# Patient Record
Sex: FEMALE | Race: WHITE | NOT HISPANIC OR LATINO | Employment: UNEMPLOYED | URBAN - NONMETROPOLITAN AREA
[De-identification: names, ages, dates, MRNs, and addresses within clinical notes are randomized per-mention and may not be internally consistent; named-entity substitution may affect disease eponyms.]

---

## 2018-01-22 ENCOUNTER — HISTORICAL (OUTPATIENT)
Dept: ADMINISTRATIVE | Facility: HOSPITAL | Age: 28
End: 2018-01-22

## 2019-06-03 ENCOUNTER — HISTORICAL (OUTPATIENT)
Dept: ADMINISTRATIVE | Facility: HOSPITAL | Age: 29
End: 2019-06-03

## 2021-05-22 ENCOUNTER — HISTORICAL (OUTPATIENT)
Dept: ADMINISTRATIVE | Facility: HOSPITAL | Age: 31
End: 2021-05-22

## 2021-10-21 ENCOUNTER — HISTORICAL (OUTPATIENT)
Dept: ADMINISTRATIVE | Facility: HOSPITAL | Age: 31
End: 2021-10-21

## 2023-02-06 ENCOUNTER — HOSPITAL ENCOUNTER (EMERGENCY)
Facility: HOSPITAL | Age: 33
Discharge: LEFT BEFORE DISCHARGE | End: 2023-02-06
Payer: MEDICARE

## 2023-02-06 VITALS
OXYGEN SATURATION: 99 % | RESPIRATION RATE: 20 BRPM | TEMPERATURE: 99 F | HEART RATE: 115 BPM | BODY MASS INDEX: 28.32 KG/M2 | WEIGHT: 170 LBS | HEIGHT: 65 IN | DIASTOLIC BLOOD PRESSURE: 94 MMHG | SYSTOLIC BLOOD PRESSURE: 139 MMHG

## 2023-02-06 PROCEDURE — 99281 EMR DPT VST MAYX REQ PHY/QHP: CPT

## 2023-12-20 ENCOUNTER — HOSPITAL ENCOUNTER (EMERGENCY)
Facility: HOSPITAL | Age: 33
Discharge: HOME | End: 2023-12-20
Payer: MEDICARE

## 2023-12-20 VITALS
TEMPERATURE: 98 F | OXYGEN SATURATION: 100 % | SYSTOLIC BLOOD PRESSURE: 122 MMHG | BODY MASS INDEX: 28.32 KG/M2 | HEART RATE: 108 BPM | WEIGHT: 170 LBS | RESPIRATION RATE: 16 BRPM | DIASTOLIC BLOOD PRESSURE: 87 MMHG | HEIGHT: 65 IN

## 2023-12-20 DIAGNOSIS — J10.1 INFLUENZA B: Primary | ICD-10-CM

## 2023-12-20 LAB
INFLUENZA A (OHS): NEGATIVE
INFLUENZA B (OHS): POSITIVE
RAPID GROUP A STREP (OHS): NEGATIVE

## 2023-12-20 PROCEDURE — 99283 EMERGENCY DEPT VISIT LOW MDM: CPT

## 2023-12-20 PROCEDURE — 87400 INFLUENZA A/B EACH AG IA: CPT | Performed by: NURSE PRACTITIONER

## 2023-12-20 PROCEDURE — 87651 STREP A DNA AMP PROBE: CPT | Performed by: NURSE PRACTITIONER

## 2023-12-20 RX ORDER — OSELTAMIVIR PHOSPHATE 75 MG/1
75 CAPSULE ORAL 2 TIMES DAILY
Qty: 10 CAPSULE | Refills: 0 | Status: SHIPPED | OUTPATIENT
Start: 2023-12-20 | End: 2023-12-25

## 2023-12-20 NOTE — Clinical Note
"Stormy Jacobo" Mabel was seen and treated in our emergency department on 12/20/2023.  She may return to work on 12/25/2023.       If you have any questions or concerns, please don't hesitate to call.      Jessie Nuno, TUTU"

## 2023-12-21 NOTE — ED PROVIDER NOTES
"Encounter Date: 12/20/2023       History     Chief Complaint   Patient presents with    Sore Throat     Pt reports "my youngest daughter had STREP and FLU about 4 days ago and I think she gave it to us. I have a sore throat and my stomach is burning. I think I need abx or something" Pt denies N/V/D. Pt capillary refill <3. Pt acyanotic. No acute distress noted.         Pt reports "my youngest daughter had STREP and FLU about 4 days ago and I think she gave it to us. I have a sore throat and my stomach is burning. I think I need abx or something" Pt denies N/V/D. Pt capillary refill <3. Pt acyanotic. No acute distress noted.            Review of patient's allergies indicates:  No Known Allergies  Past Medical History:   Diagnosis Date    ADD (attention deficit disorder)      No past surgical history on file.  No family history on file.  Social History     Tobacco Use    Smoking status: Never    Smokeless tobacco: Never   Substance Use Topics    Alcohol use: Never    Drug use: Never     Review of Systems   HENT:  Positive for sore throat.    All other systems reviewed and are negative.      Physical Exam     Initial Vitals [12/20/23 1746]   BP Pulse Resp Temp SpO2   122/87 108 16 98.4 °F (36.9 °C) 100 %      MAP       --         Physical Exam    Nursing note and vitals reviewed.  Constitutional: She appears well-developed and well-nourished.   HENT:   Head: Normocephalic and atraumatic.   Neck:   Normal range of motion.  Cardiovascular:  Normal rate.           Pulmonary/Chest: No respiratory distress.   Musculoskeletal:         General: Normal range of motion.      Cervical back: Normal range of motion.     Neurological: She is alert and oriented to person, place, and time.   Skin: Capillary refill takes less than 2 seconds. No pallor.   Psychiatric: She has a normal mood and affect. Her behavior is normal. Judgment and thought content normal.         ED Course   Procedures  Labs Reviewed   RAPID INFLUENZA A/B - " Abnormal; Notable for the following components:       Result Value    Influenza B Positive (*)     All other components within normal limits   THROAT SCREEN, RAPID STREP - Normal          Imaging Results    None          Medications - No data to display  Medical Decision Making  Problems Addressed:  Influenza B: self-limited or minor problem                                      Clinical Impression:  Final diagnoses:  [J10.1] Influenza B (Primary)          ED Disposition Condition    Discharge Stable          ED Prescriptions       Medication Sig Dispense Start Date End Date Auth. Provider    oseltamivir (TAMIFLU) 75 MG capsule Take 1 capsule (75 mg total) by mouth 2 (two) times daily. for 5 days 10 capsule 12/20/2023 12/25/2023 Jessie Nuno FNP          Follow-up Information       Follow up With Specialties Details Why Contact Info    pcp  Call  As needed              Jessie Nuno FNP  12/20/23 8351

## 2024-02-01 ENCOUNTER — HOSPITAL ENCOUNTER (EMERGENCY)
Facility: HOSPITAL | Age: 34
Discharge: HOME | End: 2024-02-01
Payer: MEDICAID

## 2024-02-01 VITALS
HEIGHT: 65 IN | WEIGHT: 170 LBS | OXYGEN SATURATION: 99 % | RESPIRATION RATE: 19 BRPM | DIASTOLIC BLOOD PRESSURE: 82 MMHG | SYSTOLIC BLOOD PRESSURE: 122 MMHG | HEART RATE: 87 BPM | BODY MASS INDEX: 28.32 KG/M2 | TEMPERATURE: 99 F

## 2024-02-01 DIAGNOSIS — N28.9 DISORDER OF KIDNEY AND URETER, UNSPECIFIED: ICD-10-CM

## 2024-02-01 DIAGNOSIS — R10.9 FLANK PAIN: Primary | ICD-10-CM

## 2024-02-01 LAB
APPEARANCE UR: CLEAR
B-HCG SERPL QL: NEGATIVE
BACTERIA #/AREA URNS AUTO: ABNORMAL /HPF
BILIRUB UR QL STRIP.AUTO: NEGATIVE
COLOR UR AUTO: YELLOW
GLUCOSE UR QL STRIP.AUTO: NEGATIVE
KETONES UR QL STRIP.AUTO: NEGATIVE
LEUKOCYTE ESTERASE UR QL STRIP.AUTO: NEGATIVE
NITRITE UR QL STRIP.AUTO: NEGATIVE
PH UR STRIP.AUTO: 6.5 [PH]
PROT UR QL STRIP.AUTO: NEGATIVE
RBC #/AREA URNS AUTO: ABNORMAL /HPF
RBC UR QL AUTO: ABNORMAL
SP GR UR STRIP.AUTO: 1.02 (ref 1–1.03)
SQUAMOUS #/AREA URNS AUTO: ABNORMAL /HPF
UROBILINOGEN UR STRIP-ACNC: 1
WBC #/AREA URNS AUTO: ABNORMAL /HPF

## 2024-02-01 PROCEDURE — 81025 URINE PREGNANCY TEST: CPT | Performed by: NURSE PRACTITIONER

## 2024-02-01 PROCEDURE — 63600175 PHARM REV CODE 636 W HCPCS: Performed by: NURSE PRACTITIONER

## 2024-02-01 PROCEDURE — 96372 THER/PROPH/DIAG INJ SC/IM: CPT | Performed by: NURSE PRACTITIONER

## 2024-02-01 PROCEDURE — 81003 URINALYSIS AUTO W/O SCOPE: CPT | Performed by: NURSE PRACTITIONER

## 2024-02-01 PROCEDURE — 99285 EMERGENCY DEPT VISIT HI MDM: CPT | Mod: 25

## 2024-02-01 RX ORDER — KETOROLAC TROMETHAMINE 10 MG/1
10 TABLET, FILM COATED ORAL EVERY 6 HOURS PRN
Qty: 15 TABLET | Refills: 0 | Status: SHIPPED | OUTPATIENT
Start: 2024-02-01 | End: 2024-02-06

## 2024-02-01 RX ORDER — TAMSULOSIN HYDROCHLORIDE 0.4 MG/1
0.4 CAPSULE ORAL DAILY
Qty: 7 CAPSULE | Refills: 0 | Status: SHIPPED | OUTPATIENT
Start: 2024-02-01 | End: 2024-02-08

## 2024-02-01 RX ORDER — CEFTRIAXONE 1 G/1
1 INJECTION, POWDER, FOR SOLUTION INTRAMUSCULAR; INTRAVENOUS
Status: COMPLETED | OUTPATIENT
Start: 2024-02-01 | End: 2024-02-01

## 2024-02-01 RX ORDER — NITROFURANTOIN 25; 75 MG/1; MG/1
100 CAPSULE ORAL 2 TIMES DAILY
Qty: 10 CAPSULE | Refills: 0 | Status: SHIPPED | OUTPATIENT
Start: 2024-02-01 | End: 2024-02-06

## 2024-02-01 RX ADMIN — CEFTRIAXONE SODIUM 1 G: 1 INJECTION, POWDER, FOR SOLUTION INTRAMUSCULAR; INTRAVENOUS at 09:02

## 2024-02-02 NOTE — ED PROVIDER NOTES
Encounter Date: 2/1/2024       History     Chief Complaint   Patient presents with    Back Pain    Flank Pain     Pt states she has been having back pain for the last 4 weeks and just thought it was constipation, so she took meds and was able to use the restroom. A few weeks ago she was having burning during urination and retention. Has since gone away and is able to urinate regularly. States she is back to having the pain in her back and both of her sides. Denies any known history of utis or kidney stones.      PT c/o flank pain for 1 month with different associated symptoms. States was having trouble with constipation and urination off and on. Took OTC laxative and had a good BM with some relief (pressure) and was able to void better. Had some dysuria but has subsided. Now having frequency and urgency and feels like output has decreased. No female related problem (finished 2 days ago). States feels a lot pressure in her back. Some nausea no vomiting. No fever or abd pain.No CP or SOB     The history is provided by the patient. No  was used.     Review of patient's allergies indicates:  No Known Allergies  Past Medical History:   Diagnosis Date    ADD (attention deficit disorder)     Depression      No past surgical history on file.  No family history on file.  Social History     Tobacco Use    Smoking status: Never    Smokeless tobacco: Never   Substance Use Topics    Alcohol use: Never    Drug use: Never     Review of Systems   Constitutional:  Positive for appetite change. Negative for diaphoresis, fatigue and fever.   Cardiovascular:  Negative for chest pain.   Gastrointestinal:  Positive for constipation and nausea. Negative for abdominal pain and vomiting.   Genitourinary:  Positive for decreased urine volume, difficulty urinating, flank pain, frequency and urgency. Negative for dysuria, hematuria, vaginal bleeding, vaginal discharge and vaginal pain.   Musculoskeletal:  Negative for gait  problem.   Skin: Negative.    Neurological:  Negative for weakness and headaches.   All other systems reviewed and are negative.      Physical Exam     Initial Vitals [02/01/24 1919]   BP Pulse Resp Temp SpO2   (!) 143/87 79 16 97.9 °F (36.6 °C) 100 %      MAP       --         Physical Exam    Constitutional: Vital signs are normal. She appears well-developed and well-nourished.   HENT:   Head: Normocephalic and atraumatic.   Eyes: Conjunctivae and lids are normal.   Cardiovascular:  Normal rate and regular rhythm.           Pulmonary/Chest: Effort normal and breath sounds normal.   Abdominal: Abdomen is soft and flat. Bowel sounds are normal. There is abdominal tenderness in the left lower quadrant.   There is right CVA tenderness.  There is left CVA tenderness. There is no rebound and no guarding.     Neurological: She is alert and oriented to person, place, and time.   Skin: Skin is warm, dry and intact.   Psychiatric: She has a normal mood and affect. Her speech is normal and behavior is normal.         ED Course   Procedures  Labs Reviewed   URINALYSIS, REFLEX TO URINE CULTURE - Abnormal; Notable for the following components:       Result Value    Blood, UA Small (*)     All other components within normal limits    Narrative:      URINE STABILITY IS 2 HOURS AT ROOM TEMP OR    SIX HOURS REFRIGERATED. PERFORMING TESTING ON    SPECIMENS GREATER THAN THIS AGE MAY AFFECT THE    FOLLOWING TESTS:    PH          SPECIFIC GRAVITY           BLOOD    CLARITY     BILIRUBIN               UROBILINOGEN   URINALYSIS, MICROSCOPIC - Abnormal; Notable for the following components:    RBC, UA 6-10 (*)     All other components within normal limits   HCG QUALITATIVE URINE - Normal          Imaging Results              CT Renal Stone Study ABD Pelvis WO (Final result)  Result time 02/01/24 20:54:41      Final result by Yuan Lopez MD (02/01/24 20:54:41)                   Impression:      Query for mesenteric adenitis.  No evidence  for obstructive uropathy.  Normal appendix.      Electronically signed by: Yuan Lopez MD  Date:    02/01/2024  Time:    20:54               Narrative:    EXAMINATION:  CT RENAL STONE STUDY ABD PELVIS WO    CLINICAL HISTORY:  Flank pain, kidney stone suspected;    TECHNIQUE:  Multiple cross-section obtained from the lung bases the pubic symphysis without the use of contrast.  Coronal and sagittal reformatted images were obtained.  An automated dose exposure technique was utilized this limits radiation does the patient.    COMPARISON:  None    FINDINGS:  Lung bases are clear.  Heart size within normal limits.    Evaluation of hollow and solid viscera is limited secondary to technique.    The liver, adrenals, and pancreas are normal.  Spleen is borderline enlarged.  The gallbladder is contracted.  Kidneys are symmetric in size without evidence for hydronephrosis or calculus.  In the expected location of the ureters no evidence for calculus.  Bladder is under distended normal.    Small bowel is of normal caliber.  Colon is also normal caliber with scattered colonic diverticula.  Moderate to large stool burden is identified throughout the colon.  The appendix is normal with scattered nonenlarged lymph nodes of the mesentery.    Uterus is anteverted without evidence for adnexal mass.  The course and caliber of the abdominal is normal.  No evidence for retroperitoneal or mesenteric adenopathy.    No suspicious osseous lesions.  Scattered spondylotic changes.  The soft tissues are grossly normal.                        Preliminary result by Jovan Ho Jr., MD (02/01/24 20:49:14)                   Impression:    1. There is mild fullness of the right renal pelvicalyceal system. No renal or ureteral calculus is identified. Correlate for possible recently passed right ureteral calculus or pyelonephritis.  2. Details and other findings as discussed above.               Narrative:    START OF REPORT:  Technique: CT  of the abdomen and pelvis was performed with axial images as well as sagittal and coronal reconstruction images without intravenous contrast.    Comparison: None available.    Clinical History: X 4 WKS BI LAT FLANK PN POSITIVE FLU UPT NEG.    Dosage Information: Automated Exposure Control was utilized.    Findings:  Lines and Tubes: None.  Thorax:  Lungs: The visualized lung bases appear unremarkable.  Pleura: No pleural effusion is seen.  Heart: The heart size is within normal limits.  Abdomen:  Abdominal Wall: No abdominal wall pathology is seen.  Liver: The liver appears unremarkable.  Biliary System: No intrahepatic or extrahepatic biliary duct dilatation is seen.  Gallbladder: The gallbladder appears unremarkable.  Pancreas: The pancreas appears unremarkable.  Spleen: The spleen appears unremarkable.  Adrenals: The adrenal glands appear unremarkable.  Kidneys: The left kidney appears unremarkable with no stones cysts masses or hydronephrosis. There is mild fullness of the right renal pelvicalyceal system. No renal or ureteral calculus is identified. Correlate for possible recently passed right ureteral calculus or pyelonephritis.  Aorta: The abdominal aorta appears unremarkable.  IVC: Unremarkable.  Bowel:  Esophagus: The visualized esophagus appears unremarkable.  Stomach: The stomach appears unremarkable.  Duodenum: Unremarkable appearing duodenum.  Small Bowel: The small bowel appears unremarkable.  Colon: Nondistended.  Appendix: The appendix appears unremarkable and is seen on series 10, images 65-72.  Peritoneum: No intraperitoneal free air or ascites is seen.    Pelvis:  Bladder: The bladder is nondistended and cannot be definitively evaluated.  Female:  Uterus: The uterus appears unremarkable.  Ovaries: No adnexal masses are seen.    Bony structures:  Dorsal Spine: The visualized dorsal spine appears unremarkable.  Bony Pelvis: The visualized bony structures of the pelvis appear unremarkable.                                          Medications   cefTRIAXone injection 1 g (has no administration in time range)     Medical Decision Making             ED Course as of 02/01/24 2103   Thu Feb 01, 2024 2050 CT Renal Stone Study ABD Pelvis WO [KL]      ED Course User Index  [KL] Aleshia Kaplan FNP                           Clinical Impression:  Final diagnoses:  [R10.9] Flank pain (Primary)  [N28.9] Disorder of kidney and ureter, unspecified          ED Disposition Condition    Discharge Stable          ED Prescriptions       Medication Sig Dispense Start Date End Date Auth. Provider    nitrofurantoin, macrocrystal-monohydrate, (MACROBID) 100 MG capsule Take 1 capsule (100 mg total) by mouth 2 (two) times daily. for 5 days 10 capsule 2/1/2024 2/6/2024 Aleshia Kaplan FNP    tamsulosin (FLOMAX) 0.4 mg Cap Take 1 capsule (0.4 mg total) by mouth once daily. for 7 days 7 capsule 2/1/2024 2/8/2024 Aleshia Kaplan FNP    ketorolac (TORADOL) 10 mg tablet Take 1 tablet (10 mg total) by mouth every 6 (six) hours as needed for Pain. 15 tablet 2/1/2024 2/6/2024 Aleshia Kaplan FNP          Follow-up Information       Follow up With Specialties Details Why Contact Info    [facility]-Emergency Dept Emergency Medicine  As needed, If symptoms worsen [address]  [facility] [id number] [phone]    [facility] - Urology Urology Schedule an appointment as soon as possible for a visit in 3 days  [address]  [facility] [id number] [phone]             Aleshia Kaplan FNP  02/01/24 2111

## 2024-02-02 NOTE — DISCHARGE INSTRUCTIONS
Take medication as directed, increase fluid intake, warm heating pad. Call to schedule an appt with PCP for follow up and urology.  At anytime thing worsen develop fever return to ED

## 2024-07-17 ENCOUNTER — HOSPITAL ENCOUNTER (EMERGENCY)
Facility: HOSPITAL | Age: 34
Discharge: HOME | End: 2024-07-17
Payer: MEDICARE

## 2024-07-17 VITALS
RESPIRATION RATE: 20 BRPM | DIASTOLIC BLOOD PRESSURE: 87 MMHG | SYSTOLIC BLOOD PRESSURE: 131 MMHG | HEART RATE: 94 BPM | BODY MASS INDEX: 28.51 KG/M2 | HEIGHT: 64 IN | TEMPERATURE: 99 F | OXYGEN SATURATION: 98 % | WEIGHT: 167 LBS

## 2024-07-17 DIAGNOSIS — K12.2 CELLULITIS AND ABSCESS OF ORAL SOFT TISSUES: Primary | ICD-10-CM

## 2024-07-17 PROBLEM — K04.7 DENTAL ABSCESS: Status: ACTIVE | Noted: 2024-07-17

## 2024-07-17 PROCEDURE — 25000003 PHARM REV CODE 250: Performed by: NURSE PRACTITIONER

## 2024-07-17 PROCEDURE — 99283 EMERGENCY DEPT VISIT LOW MDM: CPT

## 2024-07-17 RX ORDER — AMOXICILLIN AND CLAVULANATE POTASSIUM 875; 125 MG/1; MG/1
1 TABLET, FILM COATED ORAL 2 TIMES DAILY
Qty: 14 TABLET | Refills: 0 | Status: SHIPPED | OUTPATIENT
Start: 2024-07-17

## 2024-07-17 RX ORDER — LIDOCAINE HYDROCHLORIDE 20 MG/ML
5 SOLUTION OROPHARYNGEAL
Status: COMPLETED | OUTPATIENT
Start: 2024-07-17 | End: 2024-07-17

## 2024-07-17 RX ORDER — AMOXICILLIN AND CLAVULANATE POTASSIUM 875; 125 MG/1; MG/1
1 TABLET, FILM COATED ORAL
Status: COMPLETED | OUTPATIENT
Start: 2024-07-17 | End: 2024-07-17

## 2024-07-17 RX ADMIN — AMOXICILLIN AND CLAVULANATE POTASSIUM 1 TABLET: 875; 125 TABLET, FILM COATED ORAL at 06:07

## 2024-07-17 RX ADMIN — LIDOCAINE HYDROCHLORIDE 5 ML: 20 SOLUTION ORAL at 06:07

## 2024-07-17 NOTE — ED PROVIDER NOTES
Encounter Date: 7/17/2024       History     Chief Complaint   Patient presents with    Mouth Lesions     Pt ambulatory to triage with c/o rt swollen jaw and oral abscess.  Pt states when she put her mouth guard on last night, felt pain to rt side of mouth and this am she work up with swelling to jaw and abscess in mouth.     33 year old female presents with an abscess to the right lower mouth that started yesterday.      The history is provided by the patient. No  was used.     Review of patient's allergies indicates:  No Known Allergies  Past Medical History:   Diagnosis Date    ADD (attention deficit disorder)     Depression      No past surgical history on file.  No family history on file.  Social History     Tobacco Use    Smoking status: Never    Smokeless tobacco: Never   Substance Use Topics    Alcohol use: Never    Drug use: Never     Review of Systems   HENT:  Positive for dental problem.    All other systems reviewed and are negative.      Physical Exam     Initial Vitals [07/17/24 1757]   BP Pulse Resp Temp SpO2   131/87 94 20 98.9 °F (37.2 °C) 98 %      MAP       --         Physical Exam    Nursing note and vitals reviewed.  Constitutional: She appears well-developed and well-nourished.   HENT:   Head: Normocephalic and atraumatic.   Mouth/Throat: Oropharynx is clear and moist and mucous membranes are normal. Abnormal dentition. Dental abscesses and dental caries present.       Abscess ruptured at home, cellulitis is noted to surrounding gum tissue   Eyes: Conjunctivae and EOM are normal. Pupils are equal, round, and reactive to light.   Neck: Neck supple.   Normal range of motion.  Cardiovascular:  Normal rate, regular rhythm, normal heart sounds and intact distal pulses.           Pulmonary/Chest: Breath sounds normal.   Abdominal: Abdomen is soft. Bowel sounds are normal.   Musculoskeletal:         General: Normal range of motion.      Cervical back: Normal range of motion and neck  supple.     Neurological: She is alert and oriented to person, place, and time. She has normal strength.   Skin: Skin is warm and dry. Capillary refill takes less than 2 seconds.   Psychiatric: She has a normal mood and affect. Her behavior is normal. Judgment and thought content normal.         ED Course   Procedures  Labs Reviewed - No data to display       Imaging Results    None          Medications   amoxicillin-clavulanate 875-125mg per tablet 1 tablet (has no administration in time range)   LIDOcaine viscous HCl 2% oral solution 5 mL (has no administration in time range)     Medical Decision Making  Problems Addressed:  Cellulitis and abscess of oral soft tissues: acute illness or injury    Risk  Prescription drug management.                                      Clinical Impression:  Final diagnoses:  [K12.2] Cellulitis and abscess of oral soft tissues (Primary)          ED Disposition Condition    Discharge Stable          ED Prescriptions       Medication Sig Dispense Start Date End Date Auth. Provider    amoxicillin-clavulanate 875-125mg (AUGMENTIN) 875-125 mg per tablet Take 1 tablet by mouth 2 (two) times daily. 14 tablet 7/17/2024 -- Tara Carlos FNP          Follow-up Information       Follow up With Specialties Details Why Contact Info    [facility]-Emergency Dept Emergency Medicine In 3 days If symptoms worsen [address]  [facility] [id number] [phone]             Tara Carlos FNP  07/17/24 8480

## 2024-08-25 ENCOUNTER — HOSPITAL ENCOUNTER (EMERGENCY)
Facility: HOSPITAL | Age: 34
Discharge: HOME | End: 2024-08-25
Payer: MEDICARE

## 2024-08-25 VITALS
WEIGHT: 165 LBS | DIASTOLIC BLOOD PRESSURE: 97 MMHG | BODY MASS INDEX: 28.17 KG/M2 | HEART RATE: 103 BPM | SYSTOLIC BLOOD PRESSURE: 124 MMHG | RESPIRATION RATE: 20 BRPM | TEMPERATURE: 98 F | OXYGEN SATURATION: 98 % | HEIGHT: 64 IN

## 2024-08-25 DIAGNOSIS — M79.671 FOOT PAIN, RIGHT: ICD-10-CM

## 2024-08-25 DIAGNOSIS — S91.331A PUNCTURE WOUND TO FOOT, RIGHT, INITIAL ENCOUNTER: Primary | ICD-10-CM

## 2024-08-25 DIAGNOSIS — T14.90XA INJURY: ICD-10-CM

## 2024-08-25 PROCEDURE — 25000003 PHARM REV CODE 250: Performed by: NURSE PRACTITIONER

## 2024-08-25 PROCEDURE — 90715 TDAP VACCINE 7 YRS/> IM: CPT | Performed by: NURSE PRACTITIONER

## 2024-08-25 PROCEDURE — 63600175 PHARM REV CODE 636 W HCPCS: Performed by: NURSE PRACTITIONER

## 2024-08-25 PROCEDURE — 99284 EMERGENCY DEPT VISIT MOD MDM: CPT | Mod: 25

## 2024-08-25 PROCEDURE — 90471 IMMUNIZATION ADMIN: CPT | Performed by: NURSE PRACTITIONER

## 2024-08-25 RX ORDER — AMOXICILLIN AND CLAVULANATE POTASSIUM 875; 125 MG/1; MG/1
1 TABLET, FILM COATED ORAL EVERY 12 HOURS
Qty: 20 TABLET | Refills: 0 | Status: SHIPPED | OUTPATIENT
Start: 2024-08-25 | End: 2024-09-04

## 2024-08-25 RX ORDER — IBUPROFEN 600 MG/1
600 TABLET ORAL
Status: COMPLETED | OUTPATIENT
Start: 2024-08-25 | End: 2024-08-25

## 2024-08-25 RX ADMIN — TETANUS TOXOID, REDUCED DIPHTHERIA TOXOID AND ACELLULAR PERTUSSIS VACCINE, ADSORBED 0.5 ML: 5; 2.5; 8; 8; 2.5 SUSPENSION INTRAMUSCULAR at 08:08

## 2024-08-25 RX ADMIN — IBUPROFEN 600 MG: 600 TABLET, FILM COATED ORAL at 08:08

## 2024-08-26 NOTE — ED PROVIDER NOTES
"Encounter Date: 8/25/2024       History     Chief Complaint   Patient presents with    Foot Injury     Stepped on nail , puncture wound to planar aspect of right foot  "burning and tingling" onset today     33 yrs old female presents with right foot pain after stepping on a nail causing burning and tingling to right foot after incident occurred.         Review of patient's allergies indicates:  No Known Allergies  Past Medical History:   Diagnosis Date    ADD (attention deficit disorder)     Depression      History reviewed. No pertinent surgical history.  No family history on file.  Social History     Tobacco Use    Smoking status: Never    Smokeless tobacco: Never   Substance Use Topics    Alcohol use: Never    Drug use: Never     Review of Systems   Musculoskeletal:  Positive for gait problem.        Right foot pain   Skin:  Positive for wound. Negative for color change, pallor and rash.   All other systems reviewed and are negative.      Physical Exam     Initial Vitals [08/25/24 1954]   BP Pulse Resp Temp SpO2   (!) 124/97 103 20 98.2 °F (36.8 °C) 98 %      MAP       --         Physical Exam    Nursing note and vitals reviewed.  Constitutional: She appears well-developed and well-nourished.   HENT:   Head: Normocephalic and atraumatic.   Right Ear: External ear normal.   Left Ear: External ear normal.   Nose: Nose normal.   Mouth/Throat: Oropharynx is clear and moist.   Eyes: Conjunctivae and EOM are normal.   Neck: Neck supple.   Normal range of motion.  Cardiovascular:  Normal rate, regular rhythm, normal heart sounds and intact distal pulses.           Pulmonary/Chest: Breath sounds normal.   Abdominal: Abdomen is soft. Bowel sounds are normal.   Musculoskeletal:         General: Normal range of motion.      Cervical back: Normal range of motion and neck supple.      Right foot: Tenderness present.      Left foot: Normal.      Comments: Tenderness upon palpation to puncture wound on plantar side of right " foot with no surrounding redness or swelling.      Neurological: She is alert and oriented to person, place, and time. She has normal strength and normal reflexes. GCS score is 15. GCS eye subscore is 4. GCS verbal subscore is 5. GCS motor subscore is 6.   Skin: Skin is warm and dry. Capillary refill takes less than 2 seconds.   Psychiatric: She has a normal mood and affect. Her behavior is normal. Judgment and thought content normal.         ED Course   Procedures  Labs Reviewed - No data to display       Imaging Results              X-Ray Foot Complete Right (Final result)  Result time 08/25/24 20:33:36      Final result by Chong Babin MD (08/25/24 20:33:36)                   Impression:      No acute abnormalities are seen      Electronically signed by: Chong Babin MD  Date:    08/25/2024  Time:    20:33               Narrative:    EXAMINATION:  XR FOOT COMPLETE 3 VIEW RIGHT    CLINICAL HISTORY:  . Pain in right foot    TECHNIQUE:  AP, lateral, and oblique views of the right foot were performed.    COMPARISON:  None    FINDINGS:  There are no fractures seen.  There is no dislocation.  There are no bony lesions noted.                        Wet Read by Carlos Alvarenga MD (08/25/24 20:30:06, [facility]-Emergency Dept, Emergency Medicine)    No radiopaque foreign body, bones are normal.                                     Medications   Tdap (BOOSTRIX) vaccine injection 0.5 mL (0.5 mLs Intramuscular Given 8/25/24 2009)   ibuprofen tablet 600 mg (600 mg Oral Given 8/25/24 2009)     Medical Decision Making  33 yrs old female presents with right foot pain after stepping on a nail causing burning and tingling to right foot after incident occurred.           Amount and/or Complexity of Data Reviewed  Radiology: ordered and independent interpretation performed.    Risk  Prescription drug management.  Risk Details: Augmentin as directed. Tylenol or Motrin as needed for pain control. Follow up with  primary care provider in 1-2 days for recheck.                           Medical Decision Making:   Differential Diagnosis:   Foot Fracture, Cellulitis of right foot, Abscess of right foot             Clinical Impression:  Final diagnoses:  [M79.671] Foot pain, right  [S91.331A] Puncture wound to foot, right, initial encounter (Primary)          ED Disposition Condition    Discharge Stable          ED Prescriptions       Medication Sig Dispense Start Date End Date Auth. Provider    amoxicillin-clavulanate 875-125mg (AUGMENTIN) 875-125 mg per tablet Take 1 tablet by mouth every 12 (twelve) hours. for 10 days 20 tablet 8/25/2024 9/4/2024 Eran Costa FNP          Follow-up Information       Follow up With Specialties Details Why Contact Info    Primary Care Provider of your choice  Schedule an appointment as soon as possible for a visit       [facility]  If symptoms worsen [address]  [facility] [id number]  [phone]             Eran Costa FNP  08/25/24 2037

## 2024-09-03 ENCOUNTER — OFFICE VISIT (OUTPATIENT)
Dept: FAMILY MEDICINE | Facility: CLINIC | Age: 34
End: 2024-09-03
Payer: MEDICARE

## 2024-09-03 VITALS
SYSTOLIC BLOOD PRESSURE: 120 MMHG | HEIGHT: 64 IN | BODY MASS INDEX: 30.05 KG/M2 | OXYGEN SATURATION: 99 % | WEIGHT: 176 LBS | TEMPERATURE: 98 F | HEART RATE: 68 BPM | DIASTOLIC BLOOD PRESSURE: 82 MMHG

## 2024-09-03 DIAGNOSIS — Z79.899 ENCOUNTER FOR LONG-TERM (CURRENT) USE OF MEDICATIONS: ICD-10-CM

## 2024-09-03 DIAGNOSIS — L03.311 CELLULITIS OF ABDOMINAL WALL: ICD-10-CM

## 2024-09-03 DIAGNOSIS — Z76.89 ENCOUNTER TO ESTABLISH CARE: Primary | ICD-10-CM

## 2024-09-03 LAB
ALBUMIN SERPL-MCNC: 3.9 G/DL (ref 3.4–5)
ALBUMIN/GLOB SERPL: 1.4 RATIO
ALP SERPL-CCNC: 60 UNIT/L (ref 50–144)
ALT SERPL-CCNC: 12 UNIT/L (ref 1–45)
ANION GAP SERPL CALC-SCNC: 5 MEQ/L (ref 2–13)
AST SERPL-CCNC: 22 UNIT/L (ref 14–36)
BASOPHILS # BLD AUTO: 0.07 X10(3)/MCL (ref 0.01–0.08)
BASOPHILS NFR BLD AUTO: 0.9 % (ref 0.1–1.2)
BILIRUB SERPL-MCNC: 0.4 MG/DL (ref 0–1)
BUN SERPL-MCNC: 5 MG/DL (ref 7–20)
CALCIUM SERPL-MCNC: 9.4 MG/DL (ref 8.4–10.2)
CHLORIDE SERPL-SCNC: 105 MMOL/L (ref 98–110)
CO2 SERPL-SCNC: 28 MMOL/L (ref 21–32)
CREAT SERPL-MCNC: 0.51 MG/DL (ref 0.66–1.25)
CREAT/UREA NIT SERPL: 10 (ref 12–20)
EOSINOPHIL # BLD AUTO: 0.23 X10(3)/MCL (ref 0.04–0.36)
EOSINOPHIL NFR BLD AUTO: 3 % (ref 0.7–7)
ERYTHROCYTE [DISTWIDTH] IN BLOOD BY AUTOMATED COUNT: 12.8 % (ref 11–14.5)
EST. AVERAGE GLUCOSE BLD GHB EST-MCNC: 96.8 MG/DL (ref 70–115)
GFR SERPLBLD CREATININE-BSD FMLA CKD-EPI: >90 ML/MIN/1.73/M2
GLOBULIN SER-MCNC: 2.7 GM/DL (ref 2–3.9)
GLUCOSE SERPL-MCNC: 75 MG/DL (ref 70–115)
HBA1C MFR BLD: 5 % (ref 4–6)
HCT VFR BLD AUTO: 39.7 % (ref 36–48)
HGB BLD-MCNC: 13.4 G/DL (ref 11.8–16)
IMM GRANULOCYTES # BLD AUTO: 0.03 X10(3)/MCL (ref 0–0.03)
IMM GRANULOCYTES NFR BLD AUTO: 0.4 % (ref 0–0.5)
LYMPHOCYTES # BLD AUTO: 1.53 X10(3)/MCL (ref 1.16–3.74)
LYMPHOCYTES NFR BLD AUTO: 20 % (ref 20–55)
MCH RBC QN AUTO: 29.7 PG (ref 27–34)
MCHC RBC AUTO-ENTMCNC: 33.8 G/DL (ref 31–37)
MCV RBC AUTO: 88 FL (ref 79–99)
MONOCYTES # BLD AUTO: 0.34 X10(3)/MCL (ref 0.24–0.36)
MONOCYTES NFR BLD AUTO: 4.5 % (ref 4.7–12.5)
NEUTROPHILS # BLD AUTO: 5.44 X10(3)/MCL (ref 1.56–6.13)
NEUTROPHILS NFR BLD AUTO: 71.2 % (ref 37–73)
NRBC BLD AUTO-RTO: 0 %
PLATELET # BLD AUTO: 356 X10(3)/MCL (ref 140–371)
PMV BLD AUTO: 10 FL (ref 9.4–12.4)
POTASSIUM SERPL-SCNC: 4.4 MMOL/L (ref 3.5–5.1)
PROT SERPL-MCNC: 6.6 GM/DL (ref 6.3–8.2)
RBC # BLD AUTO: 4.51 X10(6)/MCL (ref 4–5.1)
SODIUM SERPL-SCNC: 138 MMOL/L (ref 136–145)
TSH SERPL-ACNC: 2.93 UIU/ML (ref 0.36–3.74)
WBC # BLD AUTO: 7.64 X10(3)/MCL (ref 4–11.5)

## 2024-09-03 PROCEDURE — 84443 ASSAY THYROID STIM HORMONE: CPT | Performed by: NURSE PRACTITIONER

## 2024-09-03 PROCEDURE — 87389 HIV-1 AG W/HIV-1&-2 AB AG IA: CPT | Performed by: NURSE PRACTITIONER

## 2024-09-03 PROCEDURE — 80053 COMPREHEN METABOLIC PANEL: CPT | Performed by: NURSE PRACTITIONER

## 2024-09-03 PROCEDURE — 85025 COMPLETE CBC W/AUTO DIFF WBC: CPT | Performed by: NURSE PRACTITIONER

## 2024-09-03 PROCEDURE — 99204 OFFICE O/P NEW MOD 45 MIN: CPT | Mod: ,,, | Performed by: NURSE PRACTITIONER

## 2024-09-03 PROCEDURE — 82306 VITAMIN D 25 HYDROXY: CPT | Performed by: NURSE PRACTITIONER

## 2024-09-03 PROCEDURE — 36415 COLL VENOUS BLD VENIPUNCTURE: CPT | Performed by: NURSE PRACTITIONER

## 2024-09-03 PROCEDURE — 86803 HEPATITIS C AB TEST: CPT | Performed by: NURSE PRACTITIONER

## 2024-09-03 PROCEDURE — 83036 HEMOGLOBIN GLYCOSYLATED A1C: CPT | Performed by: NURSE PRACTITIONER

## 2024-09-03 RX ORDER — MIRTAZAPINE 15 MG/1
15 TABLET, FILM COATED ORAL NIGHTLY
COMMUNITY
Start: 2024-08-26

## 2024-09-03 RX ORDER — MUPIROCIN 20 MG/G
OINTMENT TOPICAL 3 TIMES DAILY
Qty: 22 G | Refills: 0 | Status: SHIPPED | OUTPATIENT
Start: 2024-09-03 | End: 2024-09-10

## 2024-09-03 RX ORDER — VILOXAZINE HYDROCHLORIDE 200 MG/1
1 CAPSULE, EXTENDED RELEASE ORAL
COMMUNITY
Start: 2024-08-26

## 2024-09-03 RX ORDER — BUSPIRONE HYDROCHLORIDE 15 MG/1
15 TABLET ORAL 2 TIMES DAILY
COMMUNITY
Start: 2024-08-26

## 2024-09-03 RX ORDER — VENLAFAXINE 75 MG/1
75 TABLET ORAL DAILY
COMMUNITY
Start: 2024-08-26

## 2024-09-03 RX ORDER — HYDROXYZINE HYDROCHLORIDE 25 MG/1
25 TABLET, FILM COATED ORAL DAILY PRN
COMMUNITY
Start: 2024-08-26

## 2024-09-03 NOTE — PROGRESS NOTES
"Patient ID: Stormy Monroe  : 1990    Chief Complaint: Establish Care and Rash    Allergies: Patient has No Known Allergies.     History of Present Illness:  The patient presents to clinic for Establish Care and Rash.  Patient presents to clinic to establish care with a PCP.  Has not seen a PCP in quite some time.  Complains of abdominal rash for the past few days.  Unknown exposure.  Denies shortness or breath, difficulty breathing, known allergies.    Social History:  reports that she has never smoked. She has never used smokeless tobacco. She reports that she does not drink alcohol and does not use drugs.    Past Medical History:  has a past medical history of ADD (attention deficit disorder), Anxiety, and Depression.    Surgical History: History reviewed. No pertinent surgical history.    Current Medications:  Current Outpatient Medications   Medication Instructions    busPIRone (BUSPAR) 15 mg, Oral, 2 times daily    hydrOXYzine HCL (ATARAX) 25 mg, Oral, Daily PRN    hydrOXYzine HCL (ATARAX) 25 mg, Oral, 3 times daily PRN    mirtazapine (REMERON) 15 mg, Oral, Nightly    QELBREE 200 mg Cp24 1 capsule, Oral    triamcinolone acetonide 0.1% (KENALOG) 0.1 % cream Topical (Top), 2 times daily    venlafaxine (EFFEXOR) 75 mg, Oral, Daily       Review of Systems   A comprehensive review of symptoms was completed and negative except as noted above.    Visit Vitals  /82   Pulse 68   Temp 98.3 °F (36.8 °C)   Ht 5' 4" (1.626 m)   Wt 79.8 kg (176 lb)   LMP 2024   SpO2 99%   BMI 30.21 kg/m²       Physical Exam  Vitals and nursing note reviewed.   Constitutional:       General: She is not in acute distress.     Appearance: Normal appearance. She is normal weight. She is not toxic-appearing.   HENT:      Head: Normocephalic and atraumatic.      Right Ear: Tympanic membrane, ear canal and external ear normal.      Left Ear: Tympanic membrane, ear canal and external ear normal.      Nose: Nose normal.      " Mouth/Throat:      Mouth: Mucous membranes are moist.      Pharynx: Oropharynx is clear.   Eyes:      Extraocular Movements: Extraocular movements intact.      Conjunctiva/sclera: Conjunctivae normal.      Pupils: Pupils are equal, round, and reactive to light.   Cardiovascular:      Rate and Rhythm: Normal rate and regular rhythm.      Heart sounds: Normal heart sounds. No murmur heard.     No friction rub. No gallop.   Pulmonary:      Effort: Pulmonary effort is normal.      Breath sounds: Normal breath sounds.   Abdominal:      General: Abdomen is flat. Bowel sounds are normal. There is no distension.      Palpations: Abdomen is soft. There is no mass.      Tenderness: There is no abdominal tenderness. There is no guarding or rebound.      Hernia: No hernia is present.   Musculoskeletal:         General: Normal range of motion.      Cervical back: Normal range of motion and neck supple.      Right lower leg: No edema.      Left lower leg: No edema.   Skin:     General: Skin is warm and dry.      Coloration: Skin is not jaundiced or pale.      Findings: Rash (c/o rash to abdomen - exam findings consistent with mild cellulitis. No palpable mass/swelling/abscess.) present.   Neurological:      General: No focal deficit present.      Mental Status: She is alert and oriented to person, place, and time. Mental status is at baseline.   Psychiatric:         Mood and Affect: Mood normal.         Behavior: Behavior normal.         Thought Content: Thought content normal.         Judgment: Judgment normal.          Labs Reviewed:  Chemistry:  Lab Results   Component Value Date     09/03/2024    K 4.4 09/03/2024    BUN 5 (L) 09/03/2024    CREATININE 0.51 (L) 09/03/2024    EGFRNORACEVR >90 09/03/2024    GLUCOSE 75 09/03/2024    CALCIUM 9.4 09/03/2024    ALKPHOS 60 09/03/2024    LABPROT 6.6 09/03/2024    ALBUMIN 3.9 09/03/2024    AST 22 09/03/2024    ALT 12 09/03/2024    [id number] 34 09/03/2024    TSH 2.930 09/03/2024         Lab Results   Component Value Date    HGBA1C 5.0 09/03/2024        Hematology:  Lab Results   Component Value Date    WBC 7.64 09/03/2024    RBC 4.51 09/03/2024    HGB 13.4 09/03/2024    HCT 39.7 09/03/2024    MCV 88.0 09/03/2024    MCH 29.7 09/03/2024    MCHC 33.8 09/03/2024    RDW 12.8 09/03/2024     09/03/2024    MPV 10.0 09/03/2024         Assessment & Plan:  1. Encounter to establish care  -     Hepatitis C Antibody  -     HIV 1/2 Ag/Ab (4th Gen)  -     Hemoglobin A1C  -     Vitamin D  -     TSH  -     Comprehensive Metabolic Panel  -     CBC Auto Differential  -     Ambulatory referral/consult to Gynecology; Future; Expected date: 09/10/2024    2. Cellulitis of abdominal wall  -     mupirocin (BACTROBAN) 2 % ointment; Apply topically 3 (three) times daily. for 7 days  Dispense: 22 g; Refill: 0    3. Encounter for long-term (current) use of medications  -     Hemoglobin A1C  -     Vitamin D  -     TSH  -     CBC Auto Differential         Follow up in about 1 week (around 9/10/2024) for lab review, fasting lab at [facility]   Return to the clinic as needed.    Future Appointments   Date Time Provider Department Center   9/18/2024  8:00 AM Karolyn Lopez NP [facility]   9/26/2024 10:00 AM Gayathri Banda WHNP [facility] [place] [facility] [place]

## 2024-09-04 ENCOUNTER — TELEPHONE (OUTPATIENT)
Dept: FAMILY MEDICINE | Facility: CLINIC | Age: 34
End: 2024-09-04
Payer: MEDICARE

## 2024-09-04 LAB
25(OH)D3+25(OH)D2 SERPL-MCNC: 34 NG/ML (ref 30–80)
HCV AB SERPL QL IA: NONREACTIVE
HIV 1+2 AB+HIV1 P24 AG SERPL QL IA: NONREACTIVE

## 2024-09-04 NOTE — TELEPHONE ENCOUNTER
Tried calling pt and it went straight to .     If I am not around when she calls back: There is nothing concerning or critical on the labs we received. Will go over all of them at follow up appt

## 2024-09-09 ENCOUNTER — HOSPITAL ENCOUNTER (EMERGENCY)
Facility: HOSPITAL | Age: 34
Discharge: HOME | End: 2024-09-09
Attending: FAMILY MEDICINE
Payer: MEDICARE

## 2024-09-09 VITALS
RESPIRATION RATE: 19 BRPM | HEIGHT: 64 IN | BODY MASS INDEX: 29.02 KG/M2 | WEIGHT: 170 LBS | DIASTOLIC BLOOD PRESSURE: 79 MMHG | TEMPERATURE: 98 F | SYSTOLIC BLOOD PRESSURE: 119 MMHG | HEART RATE: 87 BPM | OXYGEN SATURATION: 99 %

## 2024-09-09 DIAGNOSIS — L23.7 POISON IVY: Primary | ICD-10-CM

## 2024-09-09 PROCEDURE — 99284 EMERGENCY DEPT VISIT MOD MDM: CPT

## 2024-09-09 PROCEDURE — 63600175 PHARM REV CODE 636 W HCPCS: Performed by: FAMILY MEDICINE

## 2024-09-09 RX ORDER — PREDNISONE 20 MG/1
60 TABLET ORAL
Status: COMPLETED | OUTPATIENT
Start: 2024-09-09 | End: 2024-09-09

## 2024-09-09 RX ORDER — PREDNISONE 20 MG/1
20 TABLET ORAL DAILY
Qty: 5 TABLET | Refills: 0 | Status: SHIPPED | OUTPATIENT
Start: 2024-09-09 | End: 2024-09-14

## 2024-09-09 RX ORDER — HYDROXYZINE HYDROCHLORIDE 25 MG/1
25 TABLET, FILM COATED ORAL 3 TIMES DAILY PRN
Qty: 30 TABLET | Refills: 0 | Status: SHIPPED | OUTPATIENT
Start: 2024-09-09

## 2024-09-09 RX ORDER — TRIAMCINOLONE ACETONIDE 1 MG/G
CREAM TOPICAL 2 TIMES DAILY
Qty: 30 G | Refills: 0 | Status: SHIPPED | OUTPATIENT
Start: 2024-09-09

## 2024-09-09 RX ADMIN — PREDNISONE 60 MG: 20 TABLET ORAL at 10:09

## 2024-09-09 NOTE — TELEPHONE ENCOUNTER
Tried calling pt back multiple times to inform her we will discuss everything at her f/u visit. Left her multiple vms as well    Pt is coming in on 9/18/24

## 2024-09-10 NOTE — ED PROVIDER NOTES
Encounter Date: 9/9/2024       History     Chief Complaint   Patient presents with    Rash     Rash to bilat forearm x 2 days     Patient presents for evaluation of rash.  Patient admits to exposure to poison ivy this week.  Patient notes having pruritic rash both arms.  Patient denies having any other associated symptoms at present denies fevers chills.    The history is provided by the patient.     Review of patient's allergies indicates:  No Known Allergies  Past Medical History:   Diagnosis Date    ADD (attention deficit disorder)     Anxiety     Depression      History reviewed. No pertinent surgical history.  No family history on file.  Social History     Tobacco Use    Smoking status: Never    Smokeless tobacco: Never   Substance Use Topics    Alcohol use: Never    Drug use: Never     Review of Systems   Constitutional: Negative.    HENT: Negative.     Eyes: Negative.    Respiratory: Negative.     Cardiovascular: Negative.    Gastrointestinal: Negative.    Endocrine: Negative.    Genitourinary: Negative.    Musculoskeletal: Negative.    Skin:         Poison ivy exposure.   Allergic/Immunologic: Negative.    Neurological: Negative.    Hematological: Negative.    Psychiatric/Behavioral: Negative.         Physical Exam     Initial Vitals [09/09/24 2127]   BP Pulse Resp Temp SpO2   123/83 80 20 98.1 °F (36.7 °C) 99 %      MAP       --         Physical Exam    Vitals reviewed.  Constitutional: She appears well-developed and well-nourished. She is not diaphoretic. No distress.   HENT:   Head: Normocephalic and atraumatic.   Mouth/Throat: No oropharyngeal exudate.   Eyes: Conjunctivae and EOM are normal. Pupils are equal, round, and reactive to light. Right eye exhibits no discharge. Left eye exhibits no discharge. No scleral icterus.   Neck: Neck supple. No thyromegaly present. No tracheal deviation present.   Normal range of motion.  Cardiovascular:  Normal rate and regular rhythm.     Exam reveals no gallop and  no friction rub.       No murmur heard.  Pulmonary/Chest: Breath sounds normal. No stridor. No respiratory distress. She has no wheezes. She has no rhonchi. She has no rales.   Abdominal: Abdomen is soft. Bowel sounds are normal. She exhibits no distension. There is no abdominal tenderness. There is no rebound and no guarding.   Musculoskeletal:         General: No tenderness or edema. Normal range of motion.      Cervical back: Normal range of motion and neck supple.     Neurological: She is alert and oriented to person, place, and time. She has normal reflexes. She displays normal reflexes. No cranial nerve deficit or sensory deficit. GCS score is 15. GCS eye subscore is 4. GCS verbal subscore is 5. GCS motor subscore is 6.   Skin: Skin is warm. Capillary refill takes less than 2 seconds. Rash noted.   Maculopapular rash bilateral arms.   Psychiatric: She has a normal mood and affect.         ED Course   Procedures  Labs Reviewed - No data to display       Imaging Results    None          Medications   predniSONE tablet 60 mg (has no administration in time range)     Medical Decision Making  Risk  Prescription drug management.                                      Clinical Impression:  Final diagnoses:  [L23.7] Poison ivy (Primary)          ED Disposition Condition    Discharge Stable          ED Prescriptions       Medication Sig Dispense Start Date End Date Auth. Provider    predniSONE (DELTASONE) 20 MG tablet Take 1 tablet (20 mg total) by mouth once daily. for 5 days 5 tablet 9/9/2024 9/14/2024 Renato Victoria MD    triamcinolone acetonide 0.1% (KENALOG) 0.1 % cream Apply topically 2 (two) times daily. 30 g 9/9/2024 -- Renato Victoria MD    hydrOXYzine HCL (ATARAX) 25 MG tablet Take 1 tablet (25 mg total) by mouth 3 (three) times daily as needed for Itching. 30 tablet 9/9/2024 -- Renato Victoria MD          Follow-up Information    None          Renato Victoria MD  09/09/24 [id number]

## 2024-10-01 ENCOUNTER — OFFICE VISIT (OUTPATIENT)
Dept: FAMILY MEDICINE | Facility: CLINIC | Age: 34
End: 2024-10-01
Payer: MEDICARE

## 2024-10-01 VITALS
HEART RATE: 85 BPM | HEIGHT: 64 IN | DIASTOLIC BLOOD PRESSURE: 70 MMHG | WEIGHT: 175 LBS | BODY MASS INDEX: 29.88 KG/M2 | OXYGEN SATURATION: 98 % | SYSTOLIC BLOOD PRESSURE: 112 MMHG | TEMPERATURE: 98 F

## 2024-10-01 DIAGNOSIS — Z71.2 PERSON CONSULTING FOR EXPLANATION OF EXAMINATION OR TEST FINDING: Primary | ICD-10-CM

## 2024-10-01 DIAGNOSIS — Z76.89 ENCOUNTER TO ESTABLISH CARE: ICD-10-CM

## 2024-10-01 DIAGNOSIS — Z79.899 ENCOUNTER FOR LONG-TERM (CURRENT) USE OF MEDICATIONS: ICD-10-CM

## 2024-10-01 LAB
CHOLEST SERPL-MCNC: 132 MG/DL (ref 0–200)
HDLC SERPL-MCNC: 58 MG/DL (ref 40–60)
LDLC SERPL DIRECT ASSAY-SCNC: 66.7 MG/DL (ref 30–100)
TRIGL SERPL-MCNC: 50 MG/DL (ref 30–200)

## 2024-10-01 PROCEDURE — G2211 COMPLEX E/M VISIT ADD ON: HCPCS | Mod: ,,, | Performed by: NURSE PRACTITIONER

## 2024-10-01 PROCEDURE — 99214 OFFICE O/P EST MOD 30 MIN: CPT | Mod: ,,, | Performed by: NURSE PRACTITIONER

## 2024-10-01 PROCEDURE — 80061 LIPID PANEL: CPT | Performed by: NURSE PRACTITIONER

## 2024-10-01 NOTE — PROGRESS NOTES
"Patient ID: Stormy Monroe  : 1990    Chief Complaint: lab work (Fasting today for lipid) and Results (New pt labs done last week )    Allergies: Patient has No Known Allergies.     History of Present Illness:  The patient presents to clinic for lab work (Fasting today for lipid) and Results (New pt labs done last week )     Social History:  reports that she has never smoked. She has never used smokeless tobacco. She reports that she does not drink alcohol and does not use drugs.    Past Medical History:  has a past medical history of ADD (attention deficit disorder), Anxiety, and Depression.    Surgical History: History reviewed. No pertinent surgical history.    Current Medications:  Current Outpatient Medications   Medication Instructions    busPIRone (BUSPAR) 15 mg, 2 times daily    hydrOXYzine HCL (ATARAX) 25 mg, Oral, 3 times daily PRN    mirtazapine (REMERON) 15 mg, Nightly    QELBREE 200 mg Cp24 1 capsule    triamcinolone acetonide 0.1% (KENALOG) 0.1 % cream Topical (Top), 2 times daily    venlafaxine (EFFEXOR) 75 mg, Daily       Review of Systems   A comprehensive review of symptoms was completed and negative except as noted above.    Visit Vitals  /70 (BP Location: Right arm, Patient Position: Sitting)   Pulse 85   Temp 98.1 °F (36.7 °C) (Temporal)   Ht 5' 4" (1.626 m)   Wt 79.4 kg (175 lb)   LMP 2024 (Exact Date)   SpO2 98%   BMI 30.04 kg/m²       Physical Exam  Vitals and nursing note reviewed.   Constitutional:       General: She is not in acute distress.     Appearance: Normal appearance. She is not toxic-appearing.   HENT:      Head: Normocephalic and atraumatic.      Nose: Nose normal.      Mouth/Throat:      Mouth: Mucous membranes are moist.      Pharynx: Oropharynx is clear.   Eyes:      Extraocular Movements: Extraocular movements intact.      Conjunctiva/sclera: Conjunctivae normal.      Pupils: Pupils are equal, round, and reactive to light.   Cardiovascular:      Rate and " Rhythm: Normal rate and regular rhythm.      Heart sounds: Normal heart sounds. No murmur heard.     No friction rub. No gallop.   Pulmonary:      Effort: Pulmonary effort is normal.      Breath sounds: Normal breath sounds.   Musculoskeletal:         General: Normal range of motion.      Cervical back: Normal range of motion and neck supple.   Skin:     General: Skin is warm and dry.      Coloration: Skin is not jaundiced or pale.      Findings: No rash.   Neurological:      General: No focal deficit present.      Mental Status: She is alert and oriented to person, place, and time. Mental status is at baseline.   Psychiatric:         Mood and Affect: Mood normal.         Behavior: Behavior normal.         Thought Content: Thought content normal.         Judgment: Judgment normal.          Labs Reviewed:  Chemistry:  Lab Results   Component Value Date     09/03/2024    K 4.4 09/03/2024    BUN 5 (L) 09/03/2024    CREATININE 0.51 (L) 09/03/2024    EGFRNORACEVR >90 09/03/2024    GLUCOSE 75 09/03/2024    CALCIUM 9.4 09/03/2024    ALKPHOS 60 09/03/2024    LABPROT 6.6 09/03/2024    ALBUMIN 3.9 09/03/2024    AST 22 09/03/2024    ALT 12 09/03/2024    [id number] 34 09/03/2024    TSH 2.930 09/03/2024        Lab Results   Component Value Date    HGBA1C 5.0 09/03/2024        Hematology:  Lab Results   Component Value Date    WBC 7.64 09/03/2024    RBC 4.51 09/03/2024    HGB 13.4 09/03/2024    HCT 39.7 09/03/2024    MCV 88.0 09/03/2024    MCH 29.7 09/03/2024    MCHC 33.8 09/03/2024    RDW 12.8 09/03/2024     09/03/2024    MPV 10.0 09/03/2024       Lipid Panel:  Lab Results   Component Value Date    CHOL 132 10/01/2024    HDL 58 10/01/2024    TRIG 50 10/01/2024        Assessment & Plan:  1. Person consulting for explanation of examination or test finding    2. Encounter to establish care  -     Lipid Panel; Future; Expected date: 10/01/2024    3. Encounter for long-term (current) use of medications  -     Lipid  Panel; Future; Expected date: 10/01/2024         Follow up in about 3 months (around 1/1/2025) for Follow Up, Behavioral Health referral f/u, GYN referral f/u.   Return to the clinic as needed.    Future Appointments   Date Time Provider Department Center   10/7/2024 10:00 AM Gayathri Banda WHNP [facility]   1/9/2025 10:00 AM Karolyn Lopez NP [facility]       Lab Frequency Next Occurrence   Ambulatory referral/consult to Gynecology Once 09/10/2024        Karolyn Lopez, WOODYP-C

## 2024-10-01 NOTE — PATIENT INSTRUCTIONS
Call the clinic after 4pm for cholesterol results.     Call [facility] to find out when your next appt is.

## 2024-10-02 ENCOUNTER — TELEPHONE (OUTPATIENT)
Dept: FAMILY MEDICINE | Facility: CLINIC | Age: 34
End: 2024-10-02

## 2024-10-07 ENCOUNTER — OFFICE VISIT (OUTPATIENT)
Dept: OBSTETRICS AND GYNECOLOGY | Facility: CLINIC | Age: 34
End: 2024-10-07
Payer: MEDICARE

## 2024-10-07 VITALS
WEIGHT: 174.38 LBS | DIASTOLIC BLOOD PRESSURE: 66 MMHG | TEMPERATURE: 98 F | BODY MASS INDEX: 29.77 KG/M2 | SYSTOLIC BLOOD PRESSURE: 112 MMHG | HEIGHT: 64 IN

## 2024-10-07 DIAGNOSIS — Z11.3 SCREENING EXAMINATION FOR STD (SEXUALLY TRANSMITTED DISEASE): ICD-10-CM

## 2024-10-07 DIAGNOSIS — Z76.89 ENCOUNTER TO ESTABLISH CARE: ICD-10-CM

## 2024-10-07 DIAGNOSIS — Z01.419 WELL WOMAN EXAM WITH ROUTINE GYNECOLOGICAL EXAM: Primary | ICD-10-CM

## 2024-10-07 DIAGNOSIS — R10.2 PELVIC PAIN: ICD-10-CM

## 2024-10-07 DIAGNOSIS — Z12.4 CERVICAL CANCER SCREENING: ICD-10-CM

## 2024-10-07 PROCEDURE — 87661 TRICHOMONAS VAGINALIS AMPLIF: CPT

## 2024-10-07 PROCEDURE — 87491 CHLMYD TRACH DNA AMP PROBE: CPT

## 2024-10-07 PROCEDURE — G0101 CA SCREEN;PELVIC/BREAST EXAM: HCPCS | Mod: GZ,,,

## 2024-10-07 PROCEDURE — 87591 N.GONORRHOEAE DNA AMP PROB: CPT

## 2024-10-07 PROCEDURE — 87624 HPV HI-RISK TYP POOLED RSLT: CPT

## 2024-10-07 RX ORDER — ARIPIPRAZOLE 5 MG/1
5 TABLET ORAL
COMMUNITY
Start: 2024-10-04

## 2024-10-07 NOTE — PROGRESS NOTES
Chief Complaint     Well Woman    HPI:     Patient is a 33 y.o.  presents today referred by Karolyn Lopez NP to establish GYN care. Cyclic menses, 4 days in duration, normal flow, mild dysmenorrhea. Denies abnormal discharge, odor, bleeding. Reports occ pelvic cramping, occ cramping with intercourse.   Denies hx of abnormal PAP, last PAP ~ 8 years ago with Dr Israel in [place]. Partner with vasectomy.     Gyn History:    Menstrual History  Cycle: Yes (2024)  Menarche Age: 14 years  Flow Duration: 4  Flow: Normal  Interval: 28  Intermenstrual Bleeding: No  Dysmenorrhea: Yes  Dysmenorrhea Severity : Mild    Menopause  Menopause Age: 2 years    Pap History  Last pap date:  (unknown per pt)  Result: Normal  History of Abnormal Pap: No  HPV Vaccine Completed: No (1/3)    [place]  Sexually Active: Yes  Sexual Orientation: heterosexual  Postcoital Bleeding: No  Dyspareunia: Yes  STI History: No  Contraception: No    Breast History  Last Breast Imaging Date: No  History of Breast Biopsy: No        Past Medical History:   Diagnosis Date    ADD (attention deficit disorder)     Anxiety     Depression        History reviewed. No pertinent surgical history.    Family History   Problem Relation Name Age of Onset    Breast cancer Neg Hx      Colon cancer Neg Hx      Ovarian cancer Neg Hx      Uterine cancer Neg Hx         OB History          3    Para   3    Term   3            AB        Living   2         SAB        IAB        Ectopic        Multiple        Live Births   2                 Current Outpatient Medications on File Prior to Visit   Medication Sig Dispense Refill    ARIPiprazole (ABILIFY) 5 MG Tab Take 5 mg by mouth.      busPIRone (BUSPAR) 15 MG tablet Take 15 mg by mouth 2 (two) times daily.      hydrOXYzine HCL (ATARAX) 25 MG tablet Take 1 tablet (25 mg total) by mouth 3 (three) times daily as needed for Itching. 30 tablet 0    mirtazapine (REMERON) 15 MG tablet Take 15 mg by  "mouth every evening.      QELBREE 200 mg Cp24 Take 1 capsule by mouth.      venlafaxine (EFFEXOR) 75 MG tablet Take 75 mg by mouth Daily.      [DISCONTINUED] triamcinolone acetonide 0.1% (KENALOG) 0.1 % cream Apply topically 2 (two) times daily. 30 g 0     No current facility-administered medications on file prior to visit.       Review of Systems:       Review of Systems   Constitutional:  Negative for appetite change, chills, fatigue, fever and unexpected weight change.   Eyes:  Negative for visual disturbance.   Respiratory:  Negative for cough, shortness of breath and wheezing.    Cardiovascular:  Negative for chest pain, palpitations and leg swelling.   Gastrointestinal:  Negative for abdominal pain, bloating, blood in stool, constipation, diarrhea, nausea, vomiting, reflux and fecal incontinence.   Endocrine: Negative for hair loss and hot flashes.   Genitourinary:  Negative for bladder incontinence, decreased libido, dysmenorrhea, dyspareunia, dysuria, flank pain, frequency, genital sores, hematuria, hot flashes, menorrhagia, menstrual problem, pelvic pain, urgency, vaginal bleeding, vaginal discharge, vaginal pain, urinary incontinence, postcoital bleeding, postmenopausal bleeding, vaginal dryness and vaginal odor.   Integumentary:  Negative for rash, acne, hair changes, breast mass, nipple discharge, breast skin changes and breast tenderness.   Neurological:  Negative for headaches.   Psychiatric/Behavioral:  Negative for depression.    Breast: Negative for asymmetry, breast self exam, lump, mass, mastodynia, nipple discharge, skin changes and tenderness       Physical Exam:    /66 (BP Location: Left arm, Patient Position: Sitting)   Temp 97.9 °F (36.6 °C) (Temporal)   Ht 5' 4" (1.626 m)   Wt 79.1 kg (174 lb 6.4 oz)   LMP 09/17/2024 (Exact Date)   BMI 29.94 kg/m²     Physical Exam   Chaperone: present.    General appearance: - healthy, well-nourished and well-developed    Psychiatric: Orientation " to time, place and person. Normal mood and affect and active, alert    Skin:  Appearance: no rashes or lesions.    Neck:  Neck: supple, FROM, trachea midline. and no masses  Thyroid: no enlargement or nodules and non-tender.      Cardiovascular  Auscultation: RRR and no murmur.  Peripheral Vascular: no varicosities, LLE edema, RLE edema, calf tenderness, and palpable cord and pedal pulses intact.    Lungs:  Respiratory effort: no intercostal retractions or accessory muscle usage.  Auscultation: no wheezing, rales/crackles, or rhonchi and clear to auscultation.    Breast:  Inspection/Palpation: no tenderness, discrete/distinct masses, skin changes, or abnormal secretions. Nipple appearance normal.    Abdomen:  Auscultation/Inspection/Palpation: no hepatomegaly, splenomegaly, masses , tenderness or CVA tenderness and soft, non distended bowel sounds preset.   Hernia: no palpable hernias.    Female Genitalia:  Vulva: no masses, tenderness or lesions  Bladder/Urethra: no urethral discharge or mass, normal meatus, bladder non-distended.  Vagina: no tenderness,erythema, cystocele, rectocele, abnormal vaginal discharge,or vesicle(s) or ulcers  Cervix: no discharge , no cervical lacerations noted or motion tenderness and grossly normal  Uterus: normal size and shape and midline, non-tender, and no uterine prolapse.  Adnexa/Parametria: no parametrial tenderness or mass, no adnexal tenderness or ovarian mass.    Lymph Nodes:  Palpation: non tender submandibular nodes, axillary nodes, or inguinal nodes.    Rectal Exam:  Rectum: normal perianal skin      Assessment:   1. Well woman exam with routine gynecological exam  -     Liquid-Based Pap Smear, Screening    2. Encounter to establish care  -     Ambulatory referral/consult to Gynecology    3. Pelvic pain  -     MDL Sendout Test    4. Screening examination for STD (sexually transmitted disease)    5. Cervical cancer screening             Plan:   1. Well woman exam with  routine gynecological exam  - Liquid-Based Pap Smear, Screening    2. Encounter to establish care  - Ambulatory referral/consult to Gynecology    3. Pelvic pain  - MDL Sendout Test    4. Screening examination for STD (sexually transmitted disease)    5. Cervical cancer screening    Pap GC CZ TV  Recommend BSE monthly  Discussed recommendations of annual screening after age of 40 with mammogram    Explained that screening is not 100% reliable. Advised patient if she notices any changes to her breast including a lump, mass, dimpling, discharge, rash, or tenderness she should contact us immediately.     Healthy diet, exercise   Multivitamin   Seat belt   Sunscreen use   Safe sex/ STI education   Contraception evaluation: partner with vasectomy   Gardasil evaluation: 1/3    Pap GC CZ TV  ONESWAB MYCO UREA   If WNL and continued will order pelvic u/s    RTC    This note was transcribed by Romy Villa. There may be transcription errors as a result, however minimal. Effort has been made to ensure accuracy of transcription, but any obvious errors or omissions should be clarified with the author of the document.       I agree with the above documentation.

## 2024-10-10 LAB
CHLAMYDIA TRACHOMATIS: NEGATIVE
HIGH RISK HPV: NEGATIVE
NEISSERIA GONORRHOEAE: NEGATIVE
PSYCHE PATHOLOGY RESULT: NORMAL
TRICHOMONAS VAGINALIS: NEGATIVE

## 2024-11-25 ENCOUNTER — OFFICE VISIT (OUTPATIENT)
Dept: FAMILY MEDICINE | Facility: CLINIC | Age: 34
End: 2024-11-25
Payer: MEDICARE

## 2024-11-25 VITALS
HEIGHT: 64 IN | TEMPERATURE: 98 F | WEIGHT: 176 LBS | HEART RATE: 97 BPM | OXYGEN SATURATION: 98 % | BODY MASS INDEX: 30.05 KG/M2

## 2024-11-25 DIAGNOSIS — F43.10 PTSD (POST-TRAUMATIC STRESS DISORDER): ICD-10-CM

## 2024-11-25 DIAGNOSIS — F32.0 CURRENT MILD EPISODE OF MAJOR DEPRESSIVE DISORDER WITHOUT PRIOR EPISODE: ICD-10-CM

## 2024-11-25 DIAGNOSIS — F98.8 ATTENTION DEFICIT DISORDER, UNSPECIFIED TYPE: Primary | ICD-10-CM

## 2024-11-25 DIAGNOSIS — F41.1 GENERALIZED ANXIETY DISORDER: ICD-10-CM

## 2024-11-25 PROCEDURE — 99214 OFFICE O/P EST MOD 30 MIN: CPT | Mod: ,,, | Performed by: NURSE PRACTITIONER

## 2024-11-25 PROCEDURE — G2211 COMPLEX E/M VISIT ADD ON: HCPCS | Mod: ,,, | Performed by: NURSE PRACTITIONER

## 2024-11-25 RX ORDER — ARIPIPRAZOLE 5 MG/1
5 TABLET ORAL DAILY
Qty: 30 TABLET | Refills: 2 | Status: SHIPPED | OUTPATIENT
Start: 2024-11-25 | End: 2024-11-26 | Stop reason: SDUPTHER

## 2024-11-25 RX ORDER — BUSPIRONE HYDROCHLORIDE 15 MG/1
15 TABLET ORAL 2 TIMES DAILY
Qty: 60 TABLET | Refills: 2 | Status: SHIPPED | OUTPATIENT
Start: 2024-11-25 | End: 2024-11-26 | Stop reason: SDUPTHER

## 2024-11-25 RX ORDER — VENLAFAXINE 75 MG/1
75 TABLET ORAL DAILY
Qty: 30 TABLET | Refills: 2 | Status: SHIPPED | OUTPATIENT
Start: 2024-11-25 | End: 2024-11-26 | Stop reason: SDUPTHER

## 2024-11-25 RX ORDER — MIRTAZAPINE 15 MG/1
15 TABLET, FILM COATED ORAL NIGHTLY
Qty: 30 TABLET | Refills: 2 | Status: SHIPPED | OUTPATIENT
Start: 2024-11-25 | End: 2024-11-26 | Stop reason: SDUPTHER

## 2024-11-25 RX ORDER — HYDROXYZINE HYDROCHLORIDE 25 MG/1
25 TABLET, FILM COATED ORAL 3 TIMES DAILY PRN
Qty: 30 TABLET | Refills: 0 | Status: SHIPPED | OUTPATIENT
Start: 2024-11-25 | End: 2024-11-26 | Stop reason: SDUPTHER

## 2024-11-25 RX ORDER — DEXTROAMPHETAMINE SACCHARATE, AMPHETAMINE ASPARTATE, DEXTROAMPHETAMINE SULFATE AND AMPHETAMINE SULFATE 5; 5; 5; 5 MG/1; MG/1; MG/1; MG/1
1 TABLET ORAL DAILY
Qty: 30 TABLET | Refills: 0 | Status: SHIPPED | OUTPATIENT
Start: 2024-11-25 | End: 2024-11-26 | Stop reason: SDUPTHER

## 2024-11-25 NOTE — PROGRESS NOTES
"Patient ID: Stormy Monroe  : 1990    Chief Complaint: Depression (Wants to discuss medication ), Anxiety (Wants to discuss medication), and ADHD (Wants to get on adderall)    Allergies: Patient has No Known Allergies.     History of Present Illness:  Patient presents to clinic for medication refills for depression, anxiety and ADHD. Her father recently passed away and she missed her appt and has not been able to get her medications. Denies SI/HI.   Behavioral health: Erica Duran.     Social History:  reports that she has never smoked. She has been exposed to tobacco smoke. She has never used smokeless tobacco. She reports that she does not drink alcohol and does not use drugs.    Past Medical History:  has a past medical history of ADD (attention deficit disorder), Anxiety, and Depression.    Surgical History: History reviewed. No pertinent surgical history.    Current Medications:  Current Outpatient Medications   Medication Instructions    ARIPiprazole (ABILIFY) 5 mg, Oral, Daily    busPIRone (BUSPAR) 15 mg, Oral, 2 times daily    dextroamphetamine-amphetamine (ADDERALL) 20 mg tablet 1 tablet, Oral, Daily    hydrOXYzine HCL (ATARAX) 25 mg, Oral, 3 times daily PRN    mirtazapine (REMERON) 15 mg, Oral, Nightly    venlafaxine (EFFEXOR) 75 mg, Oral, Daily       Review of Systems   Psychiatric/Behavioral:  Negative for depressed mood, dysphoric mood and suicidal ideas. The patient is not nervous/anxious.    All other systems reviewed and are negative.     A comprehensive review of symptoms was completed and negative except as noted above.    Visit Vitals  BP (P) 116/60   Pulse 97   Temp 98.2 °F (36.8 °C)   Ht 5' 4" (1.626 m)   Wt 79.8 kg (176 lb)   SpO2 98%   BMI 30.21 kg/m²       Physical Exam  Vitals and nursing note reviewed.   Constitutional:       General: She is not in acute distress.     Appearance: Normal appearance. She is not toxic-appearing.   HENT:      Head: Normocephalic and " atraumatic.      Nose: Nose normal.      Mouth/Throat:      Mouth: Mucous membranes are moist.      Pharynx: Oropharynx is clear.   Eyes:      Extraocular Movements: Extraocular movements intact.      Conjunctiva/sclera: Conjunctivae normal.      Pupils: Pupils are equal, round, and reactive to light.   Cardiovascular:      Rate and Rhythm: Normal rate and regular rhythm.      Heart sounds: Normal heart sounds. No murmur heard.     No friction rub. No gallop.   Pulmonary:      Effort: Pulmonary effort is normal.      Breath sounds: Normal breath sounds.   Musculoskeletal:         General: Normal range of motion.      Cervical back: Normal range of motion and neck supple.   Skin:     General: Skin is warm and dry.      Coloration: Skin is not jaundiced or pale.      Findings: No rash.   Neurological:      General: No focal deficit present.      Mental Status: She is alert and oriented to person, place, and time. Mental status is at baseline.   Psychiatric:         Mood and Affect: Mood normal.         Behavior: Behavior normal.         Thought Content: Thought content normal.         Judgment: Judgment normal.          Labs Reviewed:  Chemistry:  Lab Results   Component Value Date     09/03/2024    K 4.4 09/03/2024    BUN 5 (L) 09/03/2024    CREATININE 0.51 (L) 09/03/2024    EGFRNORACEVR >90 09/03/2024    GLUCOSE 75 09/03/2024    CALCIUM 9.4 09/03/2024    ALKPHOS 60 09/03/2024    LABPROT 6.6 09/03/2024    ALBUMIN 3.9 09/03/2024    AST 22 09/03/2024    ALT 12 09/03/2024    [id number] 34 09/03/2024    TSH 2.930 09/03/2024        Lab Results   Component Value Date    HGBA1C 5.0 09/03/2024        Hematology:  Lab Results   Component Value Date    WBC 7.64 09/03/2024    RBC 4.51 09/03/2024    HGB 13.4 09/03/2024    HCT 39.7 09/03/2024    MCV 88.0 09/03/2024    MCH 29.7 09/03/2024    MCHC 33.8 09/03/2024    RDW 12.8 09/03/2024     09/03/2024    MPV 10.0 09/03/2024       Lipid Panel:  Lab Results   Component  Value Date    CHOL 132 10/01/2024    HDL 58 10/01/2024    TRIG 50 10/01/2024        No results found for this or any previous visit (from the past 24 hours).    Assessment & Plan:  1. Attention deficit disorder, unspecified type.   Patient has been off of med since 8/1/2024. Last dosage 20mg po BID.   -     restart dextroamphetamine-amphetamine (ADDERALL) 20 mg tablet; Take 1 tablet by mouth once daily.  Dispense: 30 tablet; Refill: 0    2. Current mild episode of major depressive disorder without prior episode.  Last dosages 11/4/2024  -     refill ARIPiprazole (ABILIFY) 5 MG Tab; Take 1 tablet (5 mg total) by mouth once daily.  Dispense: 30 tablet; Refill: 2  -     refill mirtazapine (REMERON) 15 MG tablet; Take 1 tablet (15 mg total) by mouth every evening.  Dispense: 30 tablet; Refill: 2    3. Generalized anxiety disorder  Last dosages 11/4/2024.  -     refill  busPIRone (BUSPAR) 15 MG tablet; Take 1 tablet (15 mg total) by mouth 2 (two) times daily.  Dispense: 60 tablet; Refill: 2  -     refill  hydrOXYzine HCL (ATARAX) 25 MG tablet; Take 1 tablet (25 mg total) by mouth 3 (three) times daily as needed for Itching.  Dispense: 30 tablet; Refill: 0    4. PTSD (post-traumatic stress disorder).  Last dosage 11/4/2024  -    refill venlafaxine (EFFEXOR) 75 MG tablet; Take 1 tablet (75 mg total) by mouth Daily.  Dispense: 30 tablet; Refill: 2         Follow up in about 4 weeks (around 12/23/2024) for Med eval.   Return to the clinic as needed.    Future Appointments   Date Time Provider Department Center   12/18/2024  9:30 AM Karolyn Lopez NP [facility]   1/9/2025 10:00 AM Karolyn Lopez NP [facility]         W[facility] Cid

## 2024-11-26 DIAGNOSIS — F43.10 PTSD (POST-TRAUMATIC STRESS DISORDER): ICD-10-CM

## 2024-11-26 DIAGNOSIS — F32.0 CURRENT MILD EPISODE OF MAJOR DEPRESSIVE DISORDER WITHOUT PRIOR EPISODE: ICD-10-CM

## 2024-11-26 DIAGNOSIS — F98.8 ATTENTION DEFICIT DISORDER, UNSPECIFIED TYPE: ICD-10-CM

## 2024-11-26 DIAGNOSIS — F41.1 GENERALIZED ANXIETY DISORDER: ICD-10-CM

## 2024-11-26 RX ORDER — HYDROXYZINE HYDROCHLORIDE 25 MG/1
25 TABLET, FILM COATED ORAL 3 TIMES DAILY PRN
Qty: 30 TABLET | Refills: 0 | Status: SHIPPED | OUTPATIENT
Start: 2024-11-26

## 2024-11-26 RX ORDER — DEXTROAMPHETAMINE SACCHARATE, AMPHETAMINE ASPARTATE, DEXTROAMPHETAMINE SULFATE AND AMPHETAMINE SULFATE 5; 5; 5; 5 MG/1; MG/1; MG/1; MG/1
1 TABLET ORAL DAILY
Qty: 30 TABLET | Refills: 0 | Status: SHIPPED | OUTPATIENT
Start: 2024-11-26

## 2024-11-26 RX ORDER — ARIPIPRAZOLE 5 MG/1
5 TABLET ORAL DAILY
Qty: 30 TABLET | Refills: 2 | Status: SHIPPED | OUTPATIENT
Start: 2024-11-26

## 2024-11-26 RX ORDER — BUSPIRONE HYDROCHLORIDE 15 MG/1
15 TABLET ORAL 2 TIMES DAILY
Qty: 60 TABLET | Refills: 2 | Status: SHIPPED | OUTPATIENT
Start: 2024-11-26

## 2024-11-26 RX ORDER — VENLAFAXINE 75 MG/1
75 TABLET ORAL DAILY
Qty: 30 TABLET | Refills: 2 | Status: SHIPPED | OUTPATIENT
Start: 2024-11-26

## 2024-11-26 RX ORDER — MIRTAZAPINE 15 MG/1
15 TABLET, FILM COATED ORAL NIGHTLY
Qty: 30 TABLET | Refills: 2 | Status: SHIPPED | OUTPATIENT
Start: 2024-11-26

## 2024-12-26 ENCOUNTER — TELEPHONE (OUTPATIENT)
Dept: FAMILY MEDICINE | Facility: CLINIC | Age: 34
End: 2024-12-26
Payer: MEDICARE

## 2024-12-26 DIAGNOSIS — F98.8 ATTENTION DEFICIT DISORDER, UNSPECIFIED TYPE: ICD-10-CM

## 2024-12-26 RX ORDER — DEXTROAMPHETAMINE SACCHARATE, AMPHETAMINE ASPARTATE MONOHYDRATE, DEXTROAMPHETAMINE SULFATE AND AMPHETAMINE SULFATE 5; 5; 5; 5 MG/1; MG/1; MG/1; MG/1
20 CAPSULE, EXTENDED RELEASE ORAL EVERY MORNING
Qty: 30 CAPSULE | Refills: 0 | Status: SHIPPED | OUTPATIENT
Start: 2024-12-26

## 2024-12-26 RX ORDER — DEXTROAMPHETAMINE SACCHARATE, AMPHETAMINE ASPARTATE, DEXTROAMPHETAMINE SULFATE AND AMPHETAMINE SULFATE 5; 5; 5; 5 MG/1; MG/1; MG/1; MG/1
1 TABLET ORAL 2 TIMES DAILY
Qty: 60 TABLET | Refills: 0 | Status: SHIPPED | OUTPATIENT
Start: 2024-12-26 | End: 2024-12-26

## 2024-12-26 NOTE — TELEPHONE ENCOUNTER
Please advise she us currently taking adderall 20mg last filled on 11/26/24 she has an  upcoming apt on 1/9/25. She wants to switch to and extended release.

## 2025-01-09 ENCOUNTER — OFFICE VISIT (OUTPATIENT)
Dept: FAMILY MEDICINE | Facility: CLINIC | Age: 35
End: 2025-01-09
Payer: MEDICARE

## 2025-01-09 DIAGNOSIS — F43.10 PTSD (POST-TRAUMATIC STRESS DISORDER): ICD-10-CM

## 2025-01-09 DIAGNOSIS — F32.0 CURRENT MILD EPISODE OF MAJOR DEPRESSIVE DISORDER WITHOUT PRIOR EPISODE: ICD-10-CM

## 2025-01-09 DIAGNOSIS — F98.8 ATTENTION DEFICIT DISORDER, UNSPECIFIED TYPE: Primary | ICD-10-CM

## 2025-01-09 DIAGNOSIS — F41.1 GENERALIZED ANXIETY DISORDER: ICD-10-CM

## 2025-01-09 PROCEDURE — 98005 SYNCH AUDIO-VIDEO EST LOW 20: CPT | Mod: 95,,, | Performed by: NURSE PRACTITIONER

## 2025-01-09 PROCEDURE — G2211 COMPLEX E/M VISIT ADD ON: HCPCS | Mod: 95,,, | Performed by: NURSE PRACTITIONER

## 2025-01-09 RX ORDER — AZITHROMYCIN 250 MG/1
TABLET, FILM COATED ORAL
Qty: 6 TABLET | Refills: 0 | Status: SHIPPED | OUTPATIENT
Start: 2025-01-09

## 2025-01-09 RX ORDER — FLUTICASONE PROPIONATE 50 MCG
1 SPRAY, SUSPENSION (ML) NASAL DAILY
Qty: 15.8 ML | Refills: 0 | Status: SHIPPED | OUTPATIENT
Start: 2025-01-09

## 2025-01-09 NOTE — PROGRESS NOTES
Patient ID: Stormy Monroe  : 1990     Chief Complaint: med eval    Allergies: Patient has No Known Allergies.     This is a telemedicine note. Patient was treated using telemedicine, real time audio and video, according to [facility] protocols. Karolyn DUMONT FNP-C, conducted the visit from the [facility]. The patient participated in the visit at a non-[facility] location selected by the patient, identified below. I am licensed in the state where the patient stated they are located. The patient stated that they understood and accepted the privacy and security risks to their information at their location. The patient verbally consented to proceed with a telemedicine visit. This visit is not recorded. Patient was located at the patient's home.     Video Time Documentation:  Time of call:   Spent _8__minutes with patient face to face discussed health concerns. More than 50% of this time was spent in counseling and coordination of care.    History of Present Illness:  Patient presents to clinic via telehealth for med evaluation.     Past Medical History:  has a past medical history of ADD (attention deficit disorder), Anxiety, and Depression.    Social History:  reports that she has never smoked. She has been exposed to tobacco smoke. She has never used smokeless tobacco. She reports that she does not drink alcohol and does not use drugs.    Family History: family history is not on file.    Current Medications:  Current Outpatient Medications   Medication Instructions    ARIPiprazole (ABILIFY) 5 mg, Oral, Daily    azithromycin (ZITHROMAX Z-HAILEY) 250 MG tablet Take 2 the first day and then 1 a day after that until all are gone.    busPIRone (BUSPAR) 15 mg, Oral, 2 times daily    dextroamphetamine-amphetamine (ADDERALL XR) 20 MG 24 hr capsule 20 mg, Oral, Every morning    fluticasone propionate (FLONASE) 50 mcg, Each Nostril, Daily    hydrOXYzine HCL (ATARAX) 25 mg, Oral, 3 times daily PRN     mirtazapine (REMERON) 15 mg, Oral, Nightly    venlafaxine (EFFEXOR) 75 mg, Oral, Daily       Patient has No Known Allergies.     Review of Systems   Constitutional:  Negative for activity change and unexpected weight change.   HENT:  Positive for rhinorrhea and sinus pressure/congestion. Negative for hearing loss and trouble swallowing.    Eyes:  Positive for discharge. Negative for visual disturbance.   Respiratory:  Negative for cough, chest tightness, shortness of breath and wheezing.    Cardiovascular:  Negative for chest pain and palpitations.   Gastrointestinal:  Negative for blood in stool, constipation, diarrhea and vomiting.   Endocrine: Negative for polydipsia and polyuria.   Genitourinary:  Negative for difficulty urinating, dysuria, hematuria and menstrual problem.   Musculoskeletal:  Negative for arthralgias, joint swelling and neck pain.   Neurological:  Negative for weakness and headaches.   Psychiatric/Behavioral:  Negative for confusion, dysphoric mood and suicidal ideas.    All other systems reviewed and are negative.       There were no vitals taken for this visit.    Physical Exam: LIMITED DUE TO TELEMEDICINE RESTRICTIONS.  General: Well nourished, Alert and oriented, No acute distress.  ENMT: Sclera non-icteric, conversational hearing normal.  Respiratory: Non-labored respirations, no dyspnea with conversation.  Musculoskeletal: normal movement of all extremities that are visible in video frame  Integumentary:  No visible suspicious lesions or rashes. No diaphoresis noted.   Psychiatric: Normal interaction, Coherent speech, Euthymic mood, Appropriate affect     Assessment & Plan:  1. Attention deficit disorder, unspecified type  Patient had been off of med since 8/1/2024. Last dosage 20mg po BID.   - restarted last month - denies any c/o or concerns.  dextroamphetamine-amphetamine (ADDERALL) 20 mg tablet; Take 1 tablet by mouth once daily.  Dispense: 30 tablet.    2. Current mild episode of  major depressive disorder without prior episode  Last dosages 11/4/2024. Meds restarted last month. Denies any c/o or concerns.   -  restarted a month ago ARIPiprazole (ABILIFY) 5 MG Tab; Take 1 tablet (5 mg total) by mouth once daily.  Dispense: 30 tablet.  -  restarted a month ago mirtazapine (REMERON) 15 MG tablet; Take 1 tablet (15 mg total) by mouth every evening.  Dispense: 30 tablet.    3. Generalized anxiety disorder  Last dosages 11/4/2024. Meds restarted last month.   -  busPIRone (BUSPAR) 15 MG tablet BID.   -  hydrOXYzine HCL (ATARAX) 25 MG tablet; Take 1 tablet (25 mg total) by mouth 3 (three) times daily as needed for anxiety.      4. PTSD (post-traumatic stress disorder)  -  control. Continue: venlafaxine (EFFEXOR) 75 MG tablet; Take 1 tablet (75 mg total) by mouth Daily.      Other orders  -     azithromycin (ZITHROMAX Z-HAILEY) 250 MG tablet; Take 2 the first day and then 1 a day after that until all are gone.  Dispense: 6 tablet; Refill: 0  -     fluticasone propionate (FLONASE) 50 mcg/actuation nasal spray; 1 spray (50 mcg total) by Each Nostril route once daily.  Dispense: 15.8 mL; Refill: 0    Follow up in about 3 months (around 4/9/2025) for 3 month med eval/CS. Call sooner if needed.  Missed behavior health f/u appt. Patient to reschedule.   GYN referral f/u - everything normal per patient.   Appt pending with dentist in [facility].     Karolyn Lopez NP

## 2025-01-28 DIAGNOSIS — F32.0 CURRENT MILD EPISODE OF MAJOR DEPRESSIVE DISORDER WITHOUT PRIOR EPISODE: ICD-10-CM

## 2025-01-28 DIAGNOSIS — F41.1 GENERALIZED ANXIETY DISORDER: ICD-10-CM

## 2025-01-28 DIAGNOSIS — F98.8 ATTENTION DEFICIT DISORDER, UNSPECIFIED TYPE: ICD-10-CM

## 2025-01-28 DIAGNOSIS — F43.10 PTSD (POST-TRAUMATIC STRESS DISORDER): ICD-10-CM

## 2025-01-28 NOTE — TELEPHONE ENCOUNTER
Patient is currently not seeing a psych PCP for any of these medications. She stated she spoke with Karolyn at last appt and informed her she would like Karolyn to fill these medications for the time being. I will make sure with Karolyn to refill before doing so.

## 2025-01-29 ENCOUNTER — TELEPHONE (OUTPATIENT)
Dept: FAMILY MEDICINE | Facility: CLINIC | Age: 35
End: 2025-01-29
Payer: MEDICARE

## 2025-01-29 RX ORDER — VENLAFAXINE 75 MG/1
75 TABLET ORAL DAILY
Qty: 30 TABLET | Refills: 2 | Status: SHIPPED | OUTPATIENT
Start: 2025-01-29

## 2025-01-29 RX ORDER — DEXTROAMPHETAMINE SACCHARATE, AMPHETAMINE ASPARTATE MONOHYDRATE, DEXTROAMPHETAMINE SULFATE AND AMPHETAMINE SULFATE 5; 5; 5; 5 MG/1; MG/1; MG/1; MG/1
20 CAPSULE, EXTENDED RELEASE ORAL EVERY MORNING
Qty: 30 CAPSULE | Refills: 0 | Status: SHIPPED | OUTPATIENT
Start: 2025-01-29

## 2025-01-29 RX ORDER — HYDROXYZINE HYDROCHLORIDE 25 MG/1
25 TABLET, FILM COATED ORAL 3 TIMES DAILY PRN
Qty: 30 TABLET | Refills: 2 | Status: SHIPPED | OUTPATIENT
Start: 2025-01-29

## 2025-01-29 RX ORDER — ARIPIPRAZOLE 5 MG/1
5 TABLET ORAL DAILY
Qty: 30 TABLET | Refills: 2 | Status: SHIPPED | OUTPATIENT
Start: 2025-01-29

## 2025-01-29 RX ORDER — MIRTAZAPINE 15 MG/1
15 TABLET, FILM COATED ORAL NIGHTLY
Qty: 30 TABLET | Refills: 2 | Status: SHIPPED | OUTPATIENT
Start: 2025-01-29

## 2025-01-29 RX ORDER — BUSPIRONE HYDROCHLORIDE 15 MG/1
15 TABLET ORAL 2 TIMES DAILY
Qty: 60 TABLET | Refills: 2 | Status: SHIPPED | OUTPATIENT
Start: 2025-01-29

## 2025-01-29 NOTE — TELEPHONE ENCOUNTER
I will speak with katrina when she is done with patients. We refilled the same medication that was filled last month. Confirmed with pharmacist at [facility].

## 2025-01-29 NOTE — TELEPHONE ENCOUNTER
Libby can you call patient and see who she is seeing for psychiatry.  According to my last note patient had a behavioral health provider and missed appt and was to call and reschedule. Meds refilled to get patient to her next appt with psychiatry.

## 2025-01-29 NOTE — TELEPHONE ENCOUNTER
Karolyn I spoke with pt and she stated she received 2 different adderall medications last month. She stated she got one that she took twice a day. When I look into the chart I see ampheta/dextro combo 20mg tab and adderall 20mg ER. Are these 2 different medications or the same? Patient isnt sure now what exactly she is supposed to be on.

## 2025-01-30 NOTE — TELEPHONE ENCOUNTER
According to patient's chart, patient received Adderal 20mg BID in December. The last medication that you sent me to sign off on was Adderall 20mg ER 1 po daily. Patient is correct. She should be on BID dosage. However, since she picked up extended release already she will have wait until end of month and refill should be Adderall 20mg po BID. (Regular, not ER)

## 2025-03-05 DIAGNOSIS — F90.9 ATTENTION DEFICIT HYPERACTIVITY DISORDER (ADHD), UNSPECIFIED ADHD TYPE: Primary | ICD-10-CM

## 2025-03-05 RX ORDER — DEXTROAMPHETAMINE SACCHARATE, AMPHETAMINE ASPARTATE, DEXTROAMPHETAMINE SULFATE AND AMPHETAMINE SULFATE 5; 5; 5; 5 MG/1; MG/1; MG/1; MG/1
1 TABLET ORAL 2 TIMES DAILY
Qty: 60 TABLET | Refills: 0 | Status: SHIPPED | OUTPATIENT
Start: 2025-03-05

## 2025-03-05 NOTE — TELEPHONE ENCOUNTER
Correct dosage sent to Karolyn to sign off on.     Patient is to be on dextroamphetamine-amphetamine (ADDERALL) 20 mg tablet twice a day.

## 2025-04-03 ENCOUNTER — OFFICE VISIT (OUTPATIENT)
Dept: FAMILY MEDICINE | Facility: CLINIC | Age: 35
End: 2025-04-03
Payer: MEDICARE

## 2025-04-03 VITALS
BODY MASS INDEX: 30.7 KG/M2 | SYSTOLIC BLOOD PRESSURE: 122 MMHG | HEIGHT: 64 IN | DIASTOLIC BLOOD PRESSURE: 84 MMHG | HEART RATE: 103 BPM | TEMPERATURE: 97 F | OXYGEN SATURATION: 99 % | WEIGHT: 179.81 LBS

## 2025-04-03 DIAGNOSIS — F90.9 ATTENTION DEFICIT HYPERACTIVITY DISORDER (ADHD), UNSPECIFIED ADHD TYPE: Primary | ICD-10-CM

## 2025-04-03 DIAGNOSIS — F41.1 GENERALIZED ANXIETY DISORDER: ICD-10-CM

## 2025-04-03 DIAGNOSIS — J30.2 SEASONAL ALLERGIES: ICD-10-CM

## 2025-04-03 DIAGNOSIS — F43.10 PTSD (POST-TRAUMATIC STRESS DISORDER): ICD-10-CM

## 2025-04-03 DIAGNOSIS — F34.1 PERSISTENT DEPRESSIVE DISORDER: ICD-10-CM

## 2025-04-03 PROBLEM — F32.0 CURRENT MILD EPISODE OF MAJOR DEPRESSIVE DISORDER WITHOUT PRIOR EPISODE: Status: ACTIVE | Noted: 2025-04-03

## 2025-04-03 PROCEDURE — G2211 COMPLEX E/M VISIT ADD ON: HCPCS | Mod: ,,, | Performed by: NURSE PRACTITIONER

## 2025-04-03 PROCEDURE — 99214 OFFICE O/P EST MOD 30 MIN: CPT | Mod: ,,, | Performed by: NURSE PRACTITIONER

## 2025-04-03 RX ORDER — MONTELUKAST SODIUM 10 MG/1
10 TABLET ORAL NIGHTLY
Qty: 30 TABLET | Refills: 3 | Status: SHIPPED | OUTPATIENT
Start: 2025-04-03

## 2025-04-03 RX ORDER — DEXTROAMPHETAMINE SACCHARATE, AMPHETAMINE ASPARTATE, DEXTROAMPHETAMINE SULFATE AND AMPHETAMINE SULFATE 5; 5; 5; 5 MG/1; MG/1; MG/1; MG/1
1 TABLET ORAL 2 TIMES DAILY
Qty: 60 TABLET | Refills: 0 | Status: SHIPPED | OUTPATIENT
Start: 2025-04-03

## 2025-04-03 NOTE — PROGRESS NOTES
Patient ID: Stormy Monroe  : 1990    Chief Complaint: Follow-up (Med eval)    Allergies: Patient has no known allergies.     History of Present Illness    Patient presents today for three month medication evaluation    ALLERGIC SYMPTOMS:  She reports nasal symptoms including rhinorrhea and persistent sneezing. She experiences eye irritation with associated rubbing of the periorbital area. She reports urinary incontinence that occurs with sneezing episodes.    MENTAL HEALTH:  She was last seen 2025 via telemedicine for ADHD, depression, anxiety, and PTSD. She denies suicidal and homicidal ideation.    FAMILY HISTORY:  Her father is . She expresses conflicting emotions regarding his passing, including relief and shame about feeling relieved.      ROS:  General: -fever, -chills, -fatigue, -weight gain, -weight loss  Eyes: -vision changes, -redness, -discharge, +eye itchiness  ENT: -ear pain, -nasal congestion, -sore throat, +runny nose, +nasal discharge  Cardiovascular: -chest pain, -palpitations, -lower extremity edema  Respiratory: -cough, -shortness of breath  Gastrointestinal: -abdominal pain, -nausea, -vomiting, -diarrhea, -constipation, -blood in stool  Genitourinary: -dysuria, -hematuria, -frequency  Musculoskeletal: -joint pain, -muscle pain  Skin: -rash, -lesion  Neurological: -headache, -dizziness, -numbness, -tingling  Psychiatric: -anxiety, -depression, -sleep difficulty  Allergic: +frequent sneezing  Female Genitourinary: +urinary incontinence          Social History:  reports that she has never smoked. She has been exposed to tobacco smoke. She has never used smokeless tobacco. She reports that she does not drink alcohol and does not use drugs.    Past Medical History:  has a past medical history of ADD (attention deficit disorder) and Depression.    Surgical History: History reviewed. No pertinent surgical history.    Current Medications:  Current Outpatient Medications  "  Medication Instructions    ARIPiprazole (ABILIFY) 5 mg, Oral, Daily    busPIRone (BUSPAR) 15 mg, Oral, 2 times daily    dextroamphetamine-amphetamine (ADDERALL) 20 mg tablet 1 tablet, Oral, 2 times daily    fluticasone propionate (FLONASE) 50 mcg, Each Nostril, Daily    hydrOXYzine HCL (ATARAX) 25 mg, Oral, 3 times daily PRN    mirtazapine (REMERON) 15 mg, Oral, Nightly    montelukast (SINGULAIR) 10 mg, Oral, Nightly    venlafaxine (EFFEXOR) 75 mg, Oral, Daily       Review of Systems   A comprehensive review of symptoms was completed and negative except as noted above.    Visit Vitals  /84 (BP Location: Right arm, Patient Position: Sitting)   Pulse 103   Temp 97.4 °F (36.3 °C) (Temporal)   Ht 5' 4.02" (1.626 m)   Wt 81.6 kg (179 lb 12.8 oz)   LMP 03/20/2025 (Approximate)   SpO2 99%   BMI 30.85 kg/m²       Physical Exam  Vitals and nursing note reviewed.   Constitutional:       General: She is not in acute distress.     Appearance: Normal appearance. She is not toxic-appearing.   HENT:      Head: Normocephalic and atraumatic.      Nose: Nose normal.      Mouth/Throat:      Mouth: Mucous membranes are moist.      Pharynx: Oropharynx is clear.   Eyes:      Extraocular Movements: Extraocular movements intact.      Conjunctiva/sclera: Conjunctivae normal.      Pupils: Pupils are equal, round, and reactive to light.   Cardiovascular:      Rate and Rhythm: Normal rate and regular rhythm.      Heart sounds: Normal heart sounds. No murmur heard.     No friction rub. No gallop.   Pulmonary:      Effort: Pulmonary effort is normal.      Breath sounds: Normal breath sounds.   Musculoskeletal:         General: Normal range of motion.      Cervical back: Normal range of motion and neck supple.   Skin:     General: Skin is warm and dry.      Coloration: Skin is not jaundiced or pale.      Findings: No rash.   Neurological:      General: No focal deficit present.      Mental Status: She is alert and oriented to person, " place, and time. Mental status is at baseline.   Psychiatric:         Mood and Affect: Mood normal.         Behavior: Behavior normal.         Thought Content: Thought content normal.         Judgment: Judgment normal.          Labs Reviewed:  Chemistry:  Lab Results   Component Value Date     09/03/2024    K 4.4 09/03/2024    BUN 5 (L) 09/03/2024    CREATININE 0.51 (L) 09/03/2024    EGFRNORACEVR >90 09/03/2024    GLUCOSE 75 09/03/2024    CALCIUM 9.4 09/03/2024    ALKPHOS 60 09/03/2024    LABPROT 6.6 09/03/2024    ALBUMIN 3.9 09/03/2024    AST 22 09/03/2024    ALT 12 09/03/2024    EBBDLNRR72ET 34 09/03/2024    TSH 2.930 09/03/2024        Lab Results   Component Value Date    HGBA1C 5.0 09/03/2024        Hematology:  Lab Results   Component Value Date    WBC 7.64 09/03/2024    RBC 4.51 09/03/2024    HGB 13.4 09/03/2024    HCT 39.7 09/03/2024    MCV 88.0 09/03/2024    MCH 29.7 09/03/2024    MCHC 33.8 09/03/2024    RDW 12.8 09/03/2024     09/03/2024    MPV 10.0 09/03/2024       Lipid Panel:  Lab Results   Component Value Date    CHOL 132 10/01/2024    HDL 58 10/01/2024    TRIG 50 10/01/2024        No results found for this or any previous visit (from the past 24 hours).    Assessment & Plan:  1. Attention deficit hyperactivity disorder (ADHD), unspecified ADHD type  -     dextroamphetamine-amphetamine (ADDERALL) 20 mg tablet; Take 1 tablet by mouth 2 (two) times a day.  Dispense: 60 tablet; Refill: 0    2. Seasonal allergies  -     montelukast (SINGULAIR) 10 mg tablet; Take 1 tablet (10 mg total) by mouth every evening.  Dispense: 30 tablet; Refill: 3    3. PTSD (post-traumatic stress disorder)  Overview:  On venlafaxine (EFFEXOR) 75 MG tablet po daily.   On mirtazapine (REMERON) 15 MG tablet po nightly.   On aripiprazole (ABILIFY) 5 MG Tab po daily.       4. Generalized anxiety disorder  Overview:  On hydroxyzine HCL (ATARAX) 25 MG tablet po daily.   On buspirone (BUSPAR) 15 MG tablet po twice daily.        5. Persistent depressive disorder  Overview:  On venlafaxine (EFFEXOR) 75 MG tablet po daily.   On mirtazapine (REMERON) 15 MG tablet po nightly.   On aripiprazole (ABILIFY) 5 MG Tab po daily.          Assessment & Plan    IMPRESSION:  - Evaluated for ADHD, depression, anxiety, and PTSD medication management.  - Screened for suicidal and homicidal ideation.  - Diagnosed allergic rhinitis based on reported symptoms of nasal discharge, sneezing, and itching.    SEASONAL ALLERGIC RHINITIS:  - Initiated Singulair for allergy symptom management, with expected relief from rhinorrhea, sneezing, and pruritus.  - Patient reports rhinorrhea, sneezing, and pruritus in the eyes and facial area.  - Examined the patient's nose and found it to be erythematous.  - Assessed the condition as allergic rhinitis.  - Prescribed Singulair for allergies and an additional medication to be taken nocturnally.  - Patient reports rhinorrhea and sneezing.  - Examined the patient's nose and found it to be erythematous.  - Patient reports ocular pruritus.  - Prescribed medication to alleviate eye itching.    ATTENTION-DEFICIT HYPERACTIVITY DISORDER:  Controlled. Continue medication as prescribed.   - Patient was last evaluated for ADHD 3 months ago.  - Refilled medications for ADHD.    DEPRESSIVE DISORDER:  - Patient was last evaluated for depression 3 months ago.  - Assessed for suicidal ideation.  - Refilled medications for depression.    ANXIETY DISORDER:  - Patient was last evaluated for anxiety 3 months ago.  - Refilled medications for anxiety.    POST-TRAUMATIC STRESS DISORDER:  - Patient was last evaluated for PTSD 3 months ago.  - Refilled medications for PTSD.          Follow up in about 3 months (around 7/3/2025) for 3 month med tish/IESHA.   Return to the clinic as needed.    Future Appointments   Date Time Provider Department Center   7/3/2025 10:30 AM Karolyn Lopez NP [facility]            This note was generated with  the assistance of ambient listening technology. Verbal consent was obtained by the patient and accompanying visitor(s) for the recording of patient appointment to facilitate this note. I attest to having reviewed and edited the generated note for accuracy, though some syntax or spelling errors may persist. Please contact the author of this note for any clarification.          Karolyn Lopez, TUTU-C

## 2025-04-05 PROBLEM — K04.7 DENTAL ABSCESS: Status: RESOLVED | Noted: 2024-07-17 | Resolved: 2025-04-05

## 2025-04-05 PROBLEM — J10.1 INFLUENZA B: Status: RESOLVED | Noted: 2023-12-20 | Resolved: 2025-04-05

## 2025-04-05 PROBLEM — K12.2 CELLULITIS AND ABSCESS OF ORAL SOFT TISSUES: Status: RESOLVED | Noted: 2024-07-17 | Resolved: 2025-04-05

## 2025-05-08 DIAGNOSIS — F43.10 PTSD (POST-TRAUMATIC STRESS DISORDER): ICD-10-CM

## 2025-05-08 DIAGNOSIS — F41.1 GENERALIZED ANXIETY DISORDER: ICD-10-CM

## 2025-05-08 DIAGNOSIS — F90.9 ATTENTION DEFICIT HYPERACTIVITY DISORDER (ADHD), UNSPECIFIED ADHD TYPE: ICD-10-CM

## 2025-05-08 DIAGNOSIS — F32.0 CURRENT MILD EPISODE OF MAJOR DEPRESSIVE DISORDER WITHOUT PRIOR EPISODE: ICD-10-CM

## 2025-05-08 RX ORDER — BUSPIRONE HYDROCHLORIDE 15 MG/1
15 TABLET ORAL 2 TIMES DAILY
Qty: 60 TABLET | Refills: 2 | Status: SHIPPED | OUTPATIENT
Start: 2025-05-08

## 2025-05-08 RX ORDER — DEXTROAMPHETAMINE SACCHARATE, AMPHETAMINE ASPARTATE, DEXTROAMPHETAMINE SULFATE AND AMPHETAMINE SULFATE 5; 5; 5; 5 MG/1; MG/1; MG/1; MG/1
1 TABLET ORAL 2 TIMES DAILY
Qty: 60 TABLET | Refills: 0 | Status: SHIPPED | OUTPATIENT
Start: 2025-05-08

## 2025-05-08 RX ORDER — ARIPIPRAZOLE 5 MG/1
5 TABLET ORAL DAILY
Qty: 30 TABLET | Refills: 2 | Status: SHIPPED | OUTPATIENT
Start: 2025-05-08

## 2025-05-08 RX ORDER — HYDROXYZINE HYDROCHLORIDE 25 MG/1
25 TABLET, FILM COATED ORAL 3 TIMES DAILY PRN
Qty: 30 TABLET | Refills: 2 | Status: SHIPPED | OUTPATIENT
Start: 2025-05-08

## 2025-05-08 RX ORDER — MIRTAZAPINE 15 MG/1
15 TABLET, FILM COATED ORAL NIGHTLY
Qty: 30 TABLET | Refills: 2 | Status: SHIPPED | OUTPATIENT
Start: 2025-05-08

## 2025-05-08 RX ORDER — VENLAFAXINE 75 MG/1
75 TABLET ORAL DAILY
Qty: 30 TABLET | Refills: 2 | Status: SHIPPED | OUTPATIENT
Start: 2025-05-08

## 2025-05-08 NOTE — TELEPHONE ENCOUNTER
Refill request for:   ARIPiprazole (ABILIFY) 5 MG Tab   busPIRone (BUSPAR) 15 MG tablet   hydrOXYzine HCL (ATARAX) 25 MG tablet   mirtazapine (REMERON) 15 MG tablet   venlafaxine (EFFEXOR) 75 MG tablet   dextroamphetamine-amphetamine (ADDERALL) 20 mg tablet      4/3/25 last ov  7/3/25 next ov

## 2025-06-12 DIAGNOSIS — F90.9 ATTENTION DEFICIT HYPERACTIVITY DISORDER (ADHD), UNSPECIFIED ADHD TYPE: ICD-10-CM

## 2025-06-12 RX ORDER — DEXTROAMPHETAMINE SACCHARATE, AMPHETAMINE ASPARTATE, DEXTROAMPHETAMINE SULFATE AND AMPHETAMINE SULFATE 5; 5; 5; 5 MG/1; MG/1; MG/1; MG/1
1 TABLET ORAL 2 TIMES DAILY
Qty: 60 TABLET | Refills: 0 | Status: SHIPPED | OUTPATIENT
Start: 2025-06-12

## 2025-06-29 ENCOUNTER — HOSPITAL ENCOUNTER (EMERGENCY)
Facility: HOSPITAL | Age: 35
Discharge: HOME | End: 2025-06-29
Attending: INTERNAL MEDICINE
Payer: MEDICARE

## 2025-06-29 VITALS
TEMPERATURE: 98 F | RESPIRATION RATE: 16 BRPM | BODY MASS INDEX: 29.16 KG/M2 | WEIGHT: 175 LBS | HEART RATE: 73 BPM | DIASTOLIC BLOOD PRESSURE: 87 MMHG | HEIGHT: 65 IN | SYSTOLIC BLOOD PRESSURE: 127 MMHG | OXYGEN SATURATION: 100 %

## 2025-06-29 DIAGNOSIS — L02.91 ABSCESS: Primary | ICD-10-CM

## 2025-06-29 PROCEDURE — 25000003 PHARM REV CODE 250: Performed by: NURSE PRACTITIONER

## 2025-06-29 PROCEDURE — 99283 EMERGENCY DEPT VISIT LOW MDM: CPT

## 2025-06-29 RX ORDER — CLINDAMYCIN HYDROCHLORIDE 150 MG/1
300 CAPSULE ORAL 4 TIMES DAILY
Qty: 56 CAPSULE | Refills: 0 | Status: SHIPPED | OUTPATIENT
Start: 2025-06-29 | End: 2025-07-06

## 2025-06-29 RX ORDER — CLINDAMYCIN HYDROCHLORIDE 150 MG/1
600 CAPSULE ORAL
Status: COMPLETED | OUTPATIENT
Start: 2025-06-29 | End: 2025-06-29

## 2025-06-29 RX ADMIN — CLINDAMYCIN HYDROCHLORIDE 600 MG: 150 CAPSULE ORAL at 09:06

## 2025-06-30 NOTE — ED PROVIDER NOTES
Encounter Date: 6/29/2025       History     Chief Complaint   Patient presents with    Insect Bite     Pt reports waking 4 days ago with insect bite to right thigh, getting worse, redness, burning sensation.      Stormy presents with an insect bite on her right thigh x 4 days that is red and itching.  She has taken nothing for this.  She has no fever or chills.  No open wound.       The history is provided by the patient.     Review of patient's allergies indicates:  No Known Allergies  Past Medical History:   Diagnosis Date    ADD (attention deficit disorder)     Depression      No past surgical history on file.  Family History   Problem Relation Name Age of Onset    Breast cancer Neg Hx      Colon cancer Neg Hx      Ovarian cancer Neg Hx      Uterine cancer Neg Hx       Social History[1]  Review of Systems   Constitutional:  Negative for fatigue.   Cardiovascular:  Negative for chest pain and palpitations.   Skin:  Positive for wound (insect bite to right thigh x 4 days.).       Physical Exam     Initial Vitals [06/29/25 2056]   BP Pulse Resp Temp SpO2   127/87 73 16 97.8 °F (36.6 °C) 100 %      MAP       --         Physical Exam    Nursing note and vitals reviewed.  Constitutional: She appears well-developed and well-nourished. No distress.   Cardiovascular:  Normal rate, regular rhythm and normal heart sounds.           Pulmonary/Chest: Breath sounds normal.   Abdominal: Abdomen is soft. There is no abdominal tenderness. There is no rebound and no guarding.     Neurological: She is alert and oriented to person, place, and time.   Skin: Skin is warm and dry. Rash noted.   Area of erythema to the right anterior thigh approximately 3-4 cm in diameter that is indurated and warm but not fluctuant.     Psychiatric: She has a normal mood and affect. Her behavior is normal. Thought content normal.         ED Course   Procedures  Labs Reviewed - No data to display       Imaging Results    None          Medications    clindamycin capsule 600 mg (has no administration in time range)     Medical Decision Making                                    Clinical Impression:  Final diagnoses:  [L02.91] Abscess (Primary)          ED Disposition Condition    Discharge Stable          ED Prescriptions       Medication Sig Dispense Start Date End Date Auth. Provider    clindamycin (CLEOCIN) 150 MG capsule Take 2 capsules (300 mg total) by mouth 4 (four) times daily. for 7 days 56 capsule 6/29/2025 7/6/2025 JARRET Costa FNP-C          Follow-up Information       Follow up With Specialties Details Why Contact Info    Karolyn Lopez NP Family Medicine Schedule an appointment as soon as possible for a visit in 2 days  [address] [facility] [address]  [phone]      [facility] Emergency Medicine  If symptoms worsen [id number]  [facility] [id number] [phone]                     [1]   Social History  Tobacco Use    Smoking status: Never     Passive exposure: Current (not often)    Smokeless tobacco: Never   Vaping Use    Vaping status: Never Used   Substance Use Topics    Alcohol use: Never    Drug use: Never        JARRET Costa FNP-C  06/29/25 [id number]

## 2025-07-03 ENCOUNTER — OFFICE VISIT (OUTPATIENT)
Dept: FAMILY MEDICINE | Facility: CLINIC | Age: 35
End: 2025-07-03
Payer: MEDICARE

## 2025-07-03 VITALS
HEART RATE: 74 BPM | WEIGHT: 182 LBS | HEIGHT: 65 IN | TEMPERATURE: 97 F | OXYGEN SATURATION: 98 % | BODY MASS INDEX: 30.32 KG/M2

## 2025-07-03 DIAGNOSIS — L03.115 CELLULITIS OF RIGHT LOWER EXTREMITY: Primary | ICD-10-CM

## 2025-07-03 DIAGNOSIS — F43.10 PTSD (POST-TRAUMATIC STRESS DISORDER): ICD-10-CM

## 2025-07-03 DIAGNOSIS — F90.9 ATTENTION DEFICIT HYPERACTIVITY DISORDER (ADHD), UNSPECIFIED ADHD TYPE: ICD-10-CM

## 2025-07-03 DIAGNOSIS — F34.1 PERSISTENT DEPRESSIVE DISORDER: ICD-10-CM

## 2025-07-03 DIAGNOSIS — E55.9 VITAMIN D DEFICIENCY, UNSPECIFIED: ICD-10-CM

## 2025-07-03 DIAGNOSIS — Z00.00 ANNUAL PHYSICAL EXAM: ICD-10-CM

## 2025-07-03 DIAGNOSIS — F41.1 GENERALIZED ANXIETY DISORDER: ICD-10-CM

## 2025-07-03 PROCEDURE — 99214 OFFICE O/P EST MOD 30 MIN: CPT | Mod: ,,, | Performed by: NURSE PRACTITIONER

## 2025-07-03 RX ORDER — MUPIROCIN 20 MG/G
OINTMENT TOPICAL 3 TIMES DAILY
Qty: 22 G | Refills: 0 | Status: SHIPPED | OUTPATIENT
Start: 2025-07-03

## 2025-07-06 NOTE — PROGRESS NOTES
Patient ID: Stormy Monroe  : 1990    Chief Complaint: Follow-up (3mth [facility] eval)    Allergies: Patient has no known allergies.     History of Present Illness    CHIEF COMPLAINT:  Patient presents today for three month follow up.    SKIN CONCERNS:  She reports a bruise on her leg from hitting a trailer's tire well. She also describes a potential spider bite or abscess with mild necrosis. She is particularly concerned about small, palpable knots under the skin at the injury site. While the initial bruising has resolved, the underlying subcutaneous nodules persist. She requests a prescription for a topical medication she recalls from when it was first marketed.    MEDICATIONS:  She reports full medication compliance with all current medications recently refilled. She denies need for any medication adjustments at this time.      ROS:  General: -fever, -chills, -fatigue, -weight gain, -weight loss  Eyes: -vision changes, -redness, -discharge  ENT: -ear pain, -nasal congestion, -sore throat  Cardiovascular: -chest pain, -palpitations, -lower extremity edema  Respiratory: -cough, -shortness of breath  Gastrointestinal: -abdominal pain, -nausea, -vomiting, -diarrhea, -constipation, -blood in stool  Genitourinary: -dysuria, -hematuria, -frequency  Musculoskeletal: -joint pain, -muscle pain  Skin: -rash, +lesion, +easy bruising, +lumps/masses, +insect bites  Neurological: -headache, -dizziness, -numbness, -tingling  Psychiatric: -anxiety, -depression, -sleep difficulty          Social History:  reports that she has never smoked. She has been exposed to tobacco smoke. She has never used smokeless tobacco. She reports that she does not drink alcohol and does not use drugs.    Past Medical History:  has a past medical history of ADD (attention deficit disorder) and Depression.    Surgical History: History reviewed. No pertinent surgical history.    Current Medications:  Current Outpatient Medications   Medication  "Instructions    ARIPiprazole (ABILIFY) 5 mg, Oral, Daily    busPIRone (BUSPAR) 15 mg, Oral, 2 times daily    dextroamphetamine-amphetamine (ADDERALL) 20 mg tablet 1 tablet, Oral, 2 times daily    fluticasone propionate (FLONASE) 50 mcg, Each Nostril, Daily    hydrOXYzine HCL (ATARAX) 25 mg, Oral, 3 times daily PRN    mirtazapine (REMERON) 15 mg, Oral, Nightly    montelukast (SINGULAIR) 10 mg, Oral, Nightly    mupirocin (BACTROBAN) 2 % ointment Topical (Top), 3 times daily    venlafaxine (EFFEXOR) 75 mg, Oral, Daily       Review of Systems   A comprehensive review of symptoms was completed and negative except as noted above.    Visit Vitals  Pulse 74   Temp 97.4 °F (36.3 °C) (Temporal)   Ht 5' 5" (1.651 m)   Wt 82.6 kg (182 lb)   LMP 05/30/2025 (Approximate)   SpO2 98%   BMI 30.29 kg/m²       Physical Exam  Vitals and nursing note reviewed.   Constitutional:       General: She is not in acute distress.     Appearance: Normal appearance. She is not toxic-appearing.   HENT:      Head: Normocephalic and atraumatic.      Nose: Nose normal.      Mouth/Throat:      Mouth: Mucous membranes are moist.      Pharynx: Oropharynx is clear.   Eyes:      Extraocular Movements: Extraocular movements intact.      Conjunctiva/sclera: Conjunctivae normal.      Pupils: Pupils are equal, round, and reactive to light.   Cardiovascular:      Rate and Rhythm: Normal rate and regular rhythm.      Heart sounds: Normal heart sounds. No murmur heard.     No friction rub. No gallop.   Pulmonary:      Effort: Pulmonary effort is normal.      Breath sounds: Normal breath sounds.   Musculoskeletal:         General: Normal range of motion.      Cervical back: Normal range of motion and neck supple.   Skin:     General: Skin is warm and dry.      Coloration: Skin is not jaundiced or pale.      Findings: No rash.   Neurological:      General: No focal deficit present.      Mental Status: She is alert and oriented to person, place, and time. Mental " status is at baseline.   Psychiatric:         Mood and Affect: Mood normal.         Behavior: Behavior normal.         Thought Content: Thought content normal.         Judgment: Judgment normal.        Labs Reviewed:  Chemistry:  Lab Results   Component Value Date     09/03/2024    K 4.4 09/03/2024    BUN 5 (L) 09/03/2024    CREATININE 0.51 (L) 09/03/2024    EGFRNORACEVR >90 09/03/2024    CALCIUM 9.4 09/03/2024    ALKPHOS 60 09/03/2024    ALBUMIN 3.9 09/03/2024    AST 22 09/03/2024    ALT 12 09/03/2024    [id number] 34 09/03/2024    TSH 2.930 09/03/2024        Lab Results   Component Value Date    HGBA1C 5.0 09/03/2024        Hematology:  Lab Results   Component Value Date    WBC 7.64 09/03/2024    RBC 4.51 09/03/2024    HGB 13.4 09/03/2024    HCT 39.7 09/03/2024    MCV 88.0 09/03/2024    MCH 29.7 09/03/2024    MCHC 33.8 09/03/2024    RDW 12.8 09/03/2024     09/03/2024    MPV 10.0 09/03/2024       Lipid Panel:  Lab Results   Component Value Date    CHOL 132 10/01/2024    HDL 58 10/01/2024    TRIG 50 10/01/2024        No results found for this or any previous visit (from the past 24 hours).    Assessment & Plan:  1. Cellulitis of right lower extremity  -     mupirocin (BACTROBAN) 2 % ointment; Apply topically 3 (three) times daily.  Dispense: 22 g; Refill: 0    2. Attention deficit hyperactivity disorder (ADHD), unspecified ADHD type  On Adderall 20mg po BID.   Controlled.     3. PTSD (post-traumatic stress disorder)  Overview:  On venlafaxine (EFFEXOR) 75 MG tablet po daily.   On mirtazapine (REMERON) 15 MG tablet po nightly.   On aripiprazole (ABILIFY) 5 MG Tab po daily.       4. Generalized anxiety disorder  Overview:  On hydroxyzine HCL (ATARAX) 25 MG tablet po daily.   On buspirone (BUSPAR) 15 MG tablet po twice daily.       5. Persistent depressive disorder  Overview:  On venlafaxine (EFFEXOR) 75 MG tablet po daily.   On mirtazapine (REMERON) 15 MG tablet po nightly.   On aripiprazole (ABILIFY) 5  MG Tab po daily.          Assessment & Plan    - Assessed the patient's concern about a bruise on the right lower leg from hitting it on a trailer tire part.  - The bruise has resolved to regular skin, but there is a palpable knot under the skin.  - Determined this lump is likely benign, possibly due to body's protective response.  - Explained that the body may take time to dissolve bruises or may form a protective shell around affected areas.  - Advised that the lump may take months to resolve or may never fully disappear.  - Instructed the patient to monitor for changes such as redness or signs of infection.  - Assessed the patient's concern about a potential spider bite with necrosis and palpable knots on the skin.  - Evaluated the condition, noting no current signs of erythema or infection requiring immediate intervention.  - Prescribed topical cream for the skin condition.  - Instructed the patient to monitor for changes such as erythema or signs of infection.            Follow up in about 3 months (around 10/6/2025) for Annual/wellness, Fasting Labs Prior.   Return to the clinic as needed.    Future Appointments   Date Time Provider Department Center   9/30/2025  8:40 AM NURSE, [facility] FAMILY MEDICINE [facility]   10/2/2025 10:00 AM Karolyn Lopez NP [facility]            This note was generated with the assistance of ambient listening technology. Verbal consent was obtained by the patient and accompanying visitor(s) for the recording of patient appointment to facilitate this note. I attest to having reviewed and edited the generated note for accuracy, though some syntax or spelling errors may persist. Please contact the author of this note for any clarification.          WES Cid

## 2025-07-31 DIAGNOSIS — F90.9 ATTENTION DEFICIT HYPERACTIVITY DISORDER (ADHD), UNSPECIFIED ADHD TYPE: ICD-10-CM

## 2025-07-31 RX ORDER — DEXTROAMPHETAMINE SACCHARATE, AMPHETAMINE ASPARTATE, DEXTROAMPHETAMINE SULFATE AND AMPHETAMINE SULFATE 5; 5; 5; 5 MG/1; MG/1; MG/1; MG/1
1 TABLET ORAL 2 TIMES DAILY
Qty: 60 TABLET | Refills: 0 | Status: SHIPPED | OUTPATIENT
Start: 2025-07-31

## 2025-07-31 NOTE — TELEPHONE ENCOUNTER
Refill request:   dextroamphetamine-amphetamine (ADDERALL) 20 mg tablet   @ [facility] [place]     7/3/25 last ov  10/2/25 next ov